# Patient Record
Sex: MALE | Race: OTHER | HISPANIC OR LATINO | Employment: FULL TIME | URBAN - METROPOLITAN AREA
[De-identification: names, ages, dates, MRNs, and addresses within clinical notes are randomized per-mention and may not be internally consistent; named-entity substitution may affect disease eponyms.]

---

## 2020-05-09 ENCOUNTER — LAB VISIT (OUTPATIENT)
Dept: PRIMARY CARE CLINIC | Facility: CLINIC | Age: 55
End: 2020-05-09
Payer: COMMERCIAL

## 2020-05-09 DIAGNOSIS — Z00.6 RESEARCH STUDY PATIENT: Primary | ICD-10-CM

## 2020-05-09 LAB — SARS-COV-2 IGG SERPLBLD QL IA.RAPID: NEGATIVE

## 2020-05-09 PROCEDURE — U0002 COVID-19 LAB TEST NON-CDC: HCPCS

## 2020-05-09 PROCEDURE — 86769 SARS-COV-2 COVID-19 ANTIBODY: CPT

## 2020-05-09 NOTE — RESEARCH
Date of Consent: 05/09/2020    Sponsor: [facility]    Study Title/IRB Number: Observational study of Sars-CoV2 Immunoglobulin G (IgG) seroprevalence among the [facility] population over time 2020.163  Principle Investigator: Angely Samuel, PhD    Did the patient need translation services? No   name: N/A    Prior to the Informed Consent (IC) being signed, or any study protocol required data collection, testing, procedure, or intervention being performed, the following was done and/or discussed:   Patient was given a paper copy of the IC for review    Patient was given study FAQ   Purpose of the study and qualifications to participate    Study design and tests or procedures done at this visit   Confidentiality and HIPAA Authorization for Release of Medical Records for the research trial/ subject's rights/research related injury   Risk, Benefits, Alternative Treatments, Compensation and Costs   Participation in the research trial is voluntary and patient may withdraw at anytime   Contact information for study related questions    Patient verbalizes understanding of the above: Yes  Contact information for PI and IRB given to patient: Yes  Patient able to adequately summarize: the purpose of the study, the risks associated with the study, and all procedures, testing, and follow-ups associated with the study: Yes    The consent was discussed verbally with the patient and all questions were answered satisfactorily. Patient gave verbal consent for the Seroprevalence research study with an IRB approval date of 05/08/2020.      The Consent, Consent Witness and name of Clinical Research Coordinator consenting was captured and documented in [employer].    All Inclusion and Exclusion Criteria reviewed, subject meets all Inclusion criteria and does not meet any Exclusion Criteria at this time.     Patient Eligibility was confirmed.    Patient responded to survey questions.    The following biospecimen  collection procedures were collected:    -Nasopharyngeal Swab Collection  -Blood collection

## 2020-05-12 LAB — SARS-COV-2 RNA RESP QL NAA+PROBE: NOT DETECTED

## 2020-05-22 ENCOUNTER — LAB VISIT (OUTPATIENT)
Dept: PRIMARY CARE CLINIC | Facility: CLINIC | Age: 55
End: 2020-05-22
Payer: COMMERCIAL

## 2020-05-22 DIAGNOSIS — R05.9 COUGH: Primary | ICD-10-CM

## 2020-05-22 PROCEDURE — U0003 INFECTIOUS AGENT DETECTION BY NUCLEIC ACID (DNA OR RNA); SEVERE ACUTE RESPIRATORY SYNDROME CORONAVIRUS 2 (SARS-COV-2) (CORONAVIRUS DISEASE [COVID-19]), AMPLIFIED PROBE TECHNIQUE, MAKING USE OF HIGH THROUGHPUT TECHNOLOGIES AS DESCRIBED BY CMS-2020-01-R: HCPCS

## 2020-05-24 LAB — SARS-COV-2 RNA RESP QL NAA+PROBE: NOT DETECTED

## 2020-06-23 ENCOUNTER — OFFICE VISIT (OUTPATIENT)
Dept: URGENT CARE | Facility: CLINIC | Age: 55
End: 2020-06-23
Payer: COMMERCIAL

## 2020-06-23 VITALS
WEIGHT: 240 LBS | OXYGEN SATURATION: 96 % | HEIGHT: 66 IN | HEART RATE: 90 BPM | SYSTOLIC BLOOD PRESSURE: 159 MMHG | TEMPERATURE: 99 F | BODY MASS INDEX: 38.57 KG/M2 | DIASTOLIC BLOOD PRESSURE: 97 MMHG | RESPIRATION RATE: 20 BRPM

## 2020-06-23 DIAGNOSIS — Z11.59 SCREENING FOR VIRAL DISEASE: Primary | ICD-10-CM

## 2020-06-23 PROCEDURE — 99212 OFFICE O/P EST SF 10 MIN: CPT | Mod: S$GLB,,, | Performed by: FAMILY MEDICINE

## 2020-06-23 PROCEDURE — U0003 INFECTIOUS AGENT DETECTION BY NUCLEIC ACID (DNA OR RNA); SEVERE ACUTE RESPIRATORY SYNDROME CORONAVIRUS 2 (SARS-COV-2) (CORONAVIRUS DISEASE [COVID-19]), AMPLIFIED PROBE TECHNIQUE, MAKING USE OF HIGH THROUGHPUT TECHNOLOGIES AS DESCRIBED BY CMS-2020-01-R: HCPCS

## 2020-06-23 PROCEDURE — 99212 PR OFFICE/OUTPT VISIT, EST, LEVL II, 10-19 MIN: ICD-10-PCS | Mod: S$GLB,,, | Performed by: FAMILY MEDICINE

## 2020-06-23 RX ORDER — NEBIVOLOL HYDROCHLORIDE 10 MG/1
10 TABLET ORAL DAILY
COMMUNITY
Start: 2020-06-02 | End: 2023-01-19 | Stop reason: SDUPTHER

## 2020-06-23 RX ORDER — AMLODIPINE AND OLMESARTAN MEDOXOMIL 5; 40 MG/1; MG/1
TABLET ORAL
COMMUNITY
Start: 2020-06-02 | End: 2021-10-11

## 2020-06-23 RX ORDER — ATORVASTATIN CALCIUM 20 MG/1
20 TABLET, FILM COATED ORAL DAILY
COMMUNITY
Start: 2020-05-28 | End: 2022-01-24 | Stop reason: SDUPTHER

## 2020-06-23 NOTE — PATIENT INSTRUCTIONS
Instructions for Patients with Confirmed or Suspected COVID-19    If you are awaiting your test result, you will either be called or it will be released to the patient portal.  If you have any questions about your test, please visit [email or web address] or call our COVID-19 information line at [phone].      Preventing the Spread of Coronavirus Disease 2019 (COVID-19) in Homes and Residential Communities -- Patients     Prevention steps for people with confirmed or suspected COVID-19 (including persons under investigation) who do not need to be hospitalized and people with confirmed COVID-19 who were hospitalized and determined to be medically stable to go home.    Stay home except to get medical care.    Separate yourself from other people and animals in your home.    Call ahead before visiting your doctor.    Wear a face mask.    Cover your coughs and sneezes.    Clean your hands often.    Avoid sharing personal household items.    Clean all high-touch surfaces every day.    Monitor your symptoms. Seek prompt medical attention if your illness is worsening (e.g., difficulty breathing). Before seeking care, call your healthcare provider.    If you have a medical emergency and must call 911, notify the dispatcher that you have or are being evaluated for COVID-19. If possible, put on a face mask before emergency medical services arrive.    Use the following symptom-based strategy to return to normal activity following a suspected or confirmed case of COVID-19. Continue isolation until:   o At least 3 days (72 hours) have passed since recovery defined as resolution of fever without the use of fever-reducing medications and improvement in respiratory symptoms (e.g. cough, shortness of breath), and   o At least 10 days have passed since symptoms first appeared.     Precautions for household members, intimate partners and caregivers in a non-healthcare setting of a patient with symptomatic  laboratory-confirmed COVID-19 or a patient under investigation.     Household members, intimate partners and caregivers in a non-healthcare setting may have close contact with a person with symptomatic, laboratory-confirmed COVID-19 or a person under investigation. Close contacts should monitor their health; they should call their healthcare provider right away if they develop symptoms suggestive of COVID-19 (e.g., fever, cough, shortness of breath). Close contacts should also follow these recommendations:      Make sure that you understand and can help the patient follow their healthcare providers instructions for medication(s) and care. You should help the patient with basic needs in the home and provide support for getting groceries, prescriptions, and other personal needs.    Monitor the patients symptoms. If the patient is getting sicker, call his or her healthcare provider and tell them that the patient has laboratory-confirmed COVID-19. This will help the healthcare providers office take steps to keep people in the office or waiting room from getting infected. Ask the healthcare provider to call the local or [facility] health department for additional guidance. If the patient has a medical emergency and you need to call 911, notify the dispatch personnel that the patient has or is being evaluated for COVID-19.    Household members should stay in another room or be  from the patient as much as possible. Household members should use a separate bedroom and bathroom, if available.    Prohibit visitors who do not have an essential need to be in the home.    Household members should care for any pets. Do not handle pets or other animals while sick.    Make sure that shared spaces in the home have good air flow, such as by an air conditioner.    Perform hand hygiene frequently. Wash your hands often with soap and water for at least 20 seconds or use an alcohol-based hand  that contains 60 to  95% alcohol, covering all surfaces of your hands and rubbing them together until they feel dry. Soap and water are preferred if hands are visibly dirty.    Avoid touching your eyes, nose and mouth with unwashed hands.    The patient should wear a face mask when you are around other people. If the patient is not able to wear a face mask (for example, because it causes trouble breathing), you, as the caregiver, should wear a mask when you are in the same room as the patient.    Wear a disposable face mask and gloves when you touch or have contact with the patients blood, stool or body fluids, such as saliva, sputum, nasal mucus, vomit and urine.   o Throw out disposable face masks and gloves after using them. Do not reuse.   o When removing personal protective equipment, first remove and dispose of gloves. Then, immediately clean your hands with soap and water or alcohol-based hand . Next, remove and dispose of face mask, and immediately clean your hands again with soap and water or alcohol-based hand .    Avoid sharing household items with the patient. You should not share dishes, drinking glasses, cups, eating utensils, towels, bedding or other items. After the patient uses these items, you should wash them thoroughly (see below Wash laundry thoroughly).    Clean all high-touch surfaces, such as counters, tabletops, doorknobs, bathroom fixtures, toilets, phones, keyboards, tablets and bedside tables, every day. Also, clean any surfaces that may have blood, stool or body fluids on them.   o Use a household cleaning spray or wipe, according to the label instructions. Labels contain instructions for safe and effective use of the cleaning product including precautions you should take when applying the product, such as wearing gloves and making sure you have good ventilation during use of the product.    Wash laundry thoroughly.   o Immediately remove and wash clothes or bedding that have  blood, stool or body fluids on them.  o Wear disposable gloves while handling soiled items and keep soiled items away from your body. Clean your hands (with soap and water or an alcohol-based hand ) immediately after removing your gloves.   o Read and follow directions on labels of laundry or clothing items and detergent. In general, using a normal laundry detergent according to washing machine instructions and dry thoroughly using the warmest temperatures recommended on the clothing label.    Place all used disposable gloves, face masks and other contaminated items in a lined container before disposing of them with other household waste. Clean your hands (with soap and water or an alcohol-based hand ) immediately after handling these items. Soap and water should be used preferentially if hands are visibly dirty.   Discuss any additional questions with your state or local health department

## 2020-06-23 NOTE — PROGRESS NOTES
"Subjective:       Patient ID: Toño Ye is a 54 y.o. male.    Vitals:  height is 5' 6" (1.676 m) and weight is 108.9 kg (240 lb). His temperature is 99.3 °F (37.4 °C). His blood pressure is 159/97 (abnormal) and his pulse is 90. His respiration is 20 and oxygen saturation is 96%.     Chief Complaint: COVID-19 Concerns    54 year old male c/o possible exposure to COVID.  He is asymptomatic.      Constitution: Negative for appetite change, chills, sweating, fatigue and fever.   HENT: Negative for ear pain, congestion, sinus pain, sinus pressure, sore throat, trouble swallowing and voice change.    Neck: Negative for neck pain and painful lymph nodes.   Cardiovascular: Negative for chest trauma, chest pain, leg swelling, palpitations and passing out.   Eyes: Negative for eye redness, double vision and blurred vision.   Respiratory: Negative for sleep apnea, chest tightness, cough, sputum production, bloody sputum, COPD, shortness of breath, stridor, wheezing and asthma.    Gastrointestinal: Negative for abdominal trauma, abdominal pain, abdominal bloating, history of abdominal surgery, nausea, vomiting, constipation, diarrhea, dark colored stools and heartburn.   Genitourinary: Negative for dysuria, frequency, urgency and pelvic pain.   Musculoskeletal: Negative for joint pain, joint swelling, back pain, pain with walking, muscle cramps and muscle ache.   Skin: Negative for color change, pale and rash.   Allergic/Immunologic: Negative for seasonal allergies and asthma.   Neurological: Negative for dizziness, history of vertigo, light-headedness, passing out and headaches.   Hematologic/Lymphatic: Negative for swollen lymph nodes, easy bruising/bleeding and history of blood clots. Does not bruise/bleed easily.   Psychiatric/Behavioral: Negative for nervous/anxious, sleep disturbance and depression. The patient is not nervous/anxious.        Objective:      Physical Exam   Abdominal: Normal appearance. "   Neurological: He is alert.   Nursing note and vitals reviewed.        Assessment:       1. Screening for viral disease        Plan:         Screening for viral disease  -     COVID-19 Routine Screening    counseled as to COVID-19 and test results. Verbalized understanding.

## 2020-06-26 LAB — SARS-COV-2 RNA RESP QL NAA+PROBE: NOT DETECTED

## 2020-06-29 ENCOUNTER — CLINICAL SUPPORT (OUTPATIENT)
Dept: URGENT CARE | Facility: CLINIC | Age: 55
End: 2020-06-29
Payer: COMMERCIAL

## 2020-06-29 DIAGNOSIS — Z03.818 ENCOUNTER FOR OBSERVATION FOR SUSPECTED EXPOSURE TO OTHER BIOLOGICAL AGENTS RULED OUT: ICD-10-CM

## 2020-06-29 PROCEDURE — 99211 OFF/OP EST MAY X REQ PHY/QHP: CPT | Mod: S$GLB,,, | Performed by: PHYSICIAN ASSISTANT

## 2020-06-29 PROCEDURE — U0003 INFECTIOUS AGENT DETECTION BY NUCLEIC ACID (DNA OR RNA); SEVERE ACUTE RESPIRATORY SYNDROME CORONAVIRUS 2 (SARS-COV-2) (CORONAVIRUS DISEASE [COVID-19]), AMPLIFIED PROBE TECHNIQUE, MAKING USE OF HIGH THROUGHPUT TECHNOLOGIES AS DESCRIBED BY CMS-2020-01-R: HCPCS

## 2020-06-29 PROCEDURE — 99211 PR OFFICE/OUTPT VISIT, EST, LEVL I: ICD-10-PCS | Mod: S$GLB,,, | Performed by: PHYSICIAN ASSISTANT

## 2020-07-04 LAB — SARS-COV-2 RNA RESP QL NAA+PROBE: NOT DETECTED

## 2020-07-05 ENCOUNTER — TELEPHONE (OUTPATIENT)
Dept: URGENT CARE | Facility: CLINIC | Age: 55
End: 2020-07-05

## 2020-07-05 NOTE — TELEPHONE ENCOUNTER
----- Message from Mishel Ford NP sent at 7/5/2020  2:57 PM CDT -----  Please notify patient of negative covid pcr

## 2020-07-13 ENCOUNTER — OFFICE VISIT (OUTPATIENT)
Dept: URGENT CARE | Facility: CLINIC | Age: 55
End: 2020-07-13
Payer: COMMERCIAL

## 2020-07-13 VITALS
SYSTOLIC BLOOD PRESSURE: 114 MMHG | DIASTOLIC BLOOD PRESSURE: 77 MMHG | TEMPERATURE: 98 F | HEART RATE: 81 BPM | OXYGEN SATURATION: 97 %

## 2020-07-13 DIAGNOSIS — R09.81 NASAL SINUS CONGESTION: Primary | ICD-10-CM

## 2020-07-13 DIAGNOSIS — R09.82 POST-NASAL DRIP: ICD-10-CM

## 2020-07-13 DIAGNOSIS — R05.9 COUGH: ICD-10-CM

## 2020-07-13 PROCEDURE — 99214 PR OFFICE/OUTPT VISIT, EST, LEVL IV, 30-39 MIN: ICD-10-PCS | Mod: S$GLB,,, | Performed by: NURSE PRACTITIONER

## 2020-07-13 PROCEDURE — 99214 OFFICE O/P EST MOD 30 MIN: CPT | Mod: S$GLB,,, | Performed by: NURSE PRACTITIONER

## 2020-07-13 RX ORDER — FLUTICASONE PROPIONATE 50 MCG
1 SPRAY, SUSPENSION (ML) NASAL DAILY
Qty: 15.8 ML | Refills: 1 | Status: SHIPPED | OUTPATIENT
Start: 2020-07-13 | End: 2024-02-27

## 2020-07-13 NOTE — PATIENT INSTRUCTIONS
"Return to Urgent Care or go to ER if symptoms worsen or fail to improve.  Follow up with PCP as recommended for further management.     THE FOLLOWING ARE RECOMMENDED TO HELP YOU MANAGE YOUR SYMPTOMS:    Use Nasal Saline, such as Akil Med nasal wash cannister using "large volume/low pressure" tip or Neti Pot, or nasal wash squeeze bottle, to mechanically move any post nasal drip from your eustachian tube or from the back of your throat. Use the nasal saline prior to using any topical nasal sprays to help clear the mucus so the medication is able to get to the mucus membranes.      Use Flonase 1-2 sprays/nostril per day. It is a local acting steroid nasal spray, if you develop a bloody nose, stop using the medication immediately.    Use Afrin in each nare for no longer than 3-5 days, as it is addictive. It can also dry out your mucous membranes and cause elevated blood pressure.    Take Ibuprofen or Acetaminophen according to label instructions for fever, throat pain, headache, and body aches    Use warm salt water gargles to ease your throat pain. Warm salt water gargles as needed for sore throat-  1/2 tsp salt to 1 cup warm water, gargle as desired.    Sometimes Nyquil at night is beneficial to help you get some rest, however it is sedating and does have an antihistamine, as well as tylenol.  The Nyquil behind the pharmacy counter also has the decongestant, pseudoephedrine as an additional ingredient.         "

## 2020-07-13 NOTE — PROGRESS NOTES
Subjective:       Patient ID: Toño Ye is a 54 y.o. male.    Vitals:  temperature is 98.4 °F (36.9 °C). His blood pressure is 114/77 and his pulse is 81. His oxygen saturation is 97%.     Chief Complaint: Cough    Pt states symptoms started last Wednesday with cough, sore throat, fatigue and congestion.  He has received 4 covid swabs x 6 weeks which were all negative.      Cough  This is a new problem. The current episode started in the past 7 days (last wednesday). The problem has been unchanged. The cough is non-productive. Associated symptoms include postnasal drip and rhinorrhea. Pertinent negatives include no chills, ear pain, eye redness, fever, hemoptysis, myalgias, rash, sore throat, shortness of breath or wheezing. Nothing aggravates the symptoms. He has tried OTC cough suppressant (Nyquil, Allegra, Mucinex) for the symptoms. The treatment provided no relief.       Constitution: Negative for chills, sweating, fatigue and fever.   HENT: Positive for postnasal drip. Negative for ear pain, congestion, sinus pain, sinus pressure, sore throat and voice change.    Neck: Negative for painful lymph nodes.   Eyes: Negative for eye redness.   Respiratory: Positive for cough. Negative for chest tightness, sputum production, bloody sputum, COPD, shortness of breath, stridor, wheezing and asthma.    Gastrointestinal: Negative for nausea and vomiting.   Musculoskeletal: Negative for muscle ache.   Skin: Negative for rash.   Allergic/Immunologic: Negative for seasonal allergies and asthma.   Hematologic/Lymphatic: Negative for swollen lymph nodes.       Objective:      Physical Exam   Constitutional: He is oriented to person, place, and time. He appears well-developed. He is cooperative.  Non-toxic appearance. He does not appear ill. No distress.   HENT:   Head: Normocephalic and atraumatic.   Right Ear: Hearing, external ear and ear canal normal. Tympanic membrane is bulging. Tympanic membrane is not  erythematous.   Left Ear: Hearing, tympanic membrane, external ear and ear canal normal. Tympanic membrane is not erythematous.   Nose: Nose normal. No mucosal edema, rhinorrhea or nasal deformity. No epistaxis. Right sinus exhibits no maxillary sinus tenderness and no frontal sinus tenderness. Left sinus exhibits no maxillary sinus tenderness and no frontal sinus tenderness.   Mouth/Throat: Uvula is midline, oropharynx is clear and moist and mucous membranes are normal. No trismus in the jaw. Normal dentition. No uvula swelling. No oropharyngeal exudate, posterior oropharyngeal edema or posterior oropharyngeal erythema.   Eyes: Conjunctivae and lids are normal. No scleral icterus.   Neck: Trachea normal, full passive range of motion without pain and phonation normal. Neck supple. No neck rigidity. No edema and no erythema present.   Cardiovascular: Normal rate, regular rhythm, normal heart sounds and normal pulses.   Pulmonary/Chest: Effort normal and breath sounds normal. No respiratory distress. He has no decreased breath sounds. He has no wheezes. He has no rhonchi.   Abdominal: Normal appearance.   Musculoskeletal: Normal range of motion.         General: No deformity.   Neurological: He is alert and oriented to person, place, and time. He exhibits normal muscle tone. Coordination normal.   Skin: Skin is warm, dry, intact, not diaphoretic and not pale.   Psychiatric: His speech is normal and behavior is normal. Judgment and thought content normal.   Nursing note and vitals reviewed.        Assessment:       1. Nasal sinus congestion    2. Post-nasal drip    3. Cough        Plan:         Nasal sinus congestion  -     fluticasone propionate (FLONASE) 50 mcg/actuation nasal spray; 1 spray (50 mcg total) by Each Nostril route once daily.  Dispense: 15.8 mL; Refill: 1    Post-nasal drip  -     fluticasone propionate (FLONASE) 50 mcg/actuation nasal spray; 1 spray (50 mcg total) by Each Nostril route once daily.   Dispense: 15.8 mL; Refill: 1    Cough  -     fluticasone propionate (FLONASE) 50 mcg/actuation nasal spray; 1 spray (50 mcg total) by Each Nostril route once daily.  Dispense: 15.8 mL; Refill: 1

## 2020-07-13 NOTE — LETTER
July 13, 2020     [facility]  [address]  [facility] [address]  Phone: [phone]  Fax: [phone]       Patient: Toño Ye   YOB: 1965  Date of Visit: 07/13/2020/    To Whom It May Concern:    Debbie Ye  was at [facility] on 07/14/2020. He may return to work/school on July 14, 2020 with no restrictions. If you have any questions or concerns, or if I can be of further assistance, please do not hesitate to contact me.    Sincerely,          Rsoanna Diane MA

## 2021-10-11 ENCOUNTER — OFFICE VISIT (OUTPATIENT)
Dept: FAMILY MEDICINE | Facility: CLINIC | Age: 56
End: 2021-10-11
Payer: COMMERCIAL

## 2021-10-11 VITALS
OXYGEN SATURATION: 98 % | SYSTOLIC BLOOD PRESSURE: 128 MMHG | DIASTOLIC BLOOD PRESSURE: 82 MMHG | HEART RATE: 76 BPM | RESPIRATION RATE: 18 BRPM | WEIGHT: 263.69 LBS | TEMPERATURE: 98 F | HEIGHT: 67 IN | BODY MASS INDEX: 41.39 KG/M2

## 2021-10-11 DIAGNOSIS — Z00.00 ANNUAL PHYSICAL EXAM: Primary | ICD-10-CM

## 2021-10-11 DIAGNOSIS — Z11.59 ENCOUNTER FOR HEPATITIS C SCREENING TEST FOR LOW RISK PATIENT: ICD-10-CM

## 2021-10-11 DIAGNOSIS — Z12.5 PROSTATE CANCER SCREENING: ICD-10-CM

## 2021-10-11 PROCEDURE — 3008F BODY MASS INDEX DOCD: CPT | Mod: CPTII,S$GLB,, | Performed by: INTERNAL MEDICINE

## 2021-10-11 PROCEDURE — 3079F DIAST BP 80-89 MM HG: CPT | Mod: CPTII,S$GLB,, | Performed by: INTERNAL MEDICINE

## 2021-10-11 PROCEDURE — 3079F PR MOST RECENT DIASTOLIC BLOOD PRESSURE 80-89 MM HG: ICD-10-PCS | Mod: CPTII,S$GLB,, | Performed by: INTERNAL MEDICINE

## 2021-10-11 PROCEDURE — 1160F PR REVIEW ALL MEDS BY PRESCRIBER/CLIN PHARMACIST DOCUMENTED: ICD-10-PCS | Mod: CPTII,S$GLB,, | Performed by: INTERNAL MEDICINE

## 2021-10-11 PROCEDURE — 1160F RVW MEDS BY RX/DR IN RCRD: CPT | Mod: CPTII,S$GLB,, | Performed by: INTERNAL MEDICINE

## 2021-10-11 PROCEDURE — 3074F PR MOST RECENT SYSTOLIC BLOOD PRESSURE < 130 MM HG: ICD-10-PCS | Mod: CPTII,S$GLB,, | Performed by: INTERNAL MEDICINE

## 2021-10-11 PROCEDURE — 99999 PR PBB SHADOW E&M-EST. PATIENT-LVL IV: ICD-10-PCS | Mod: PBBFAC,,, | Performed by: INTERNAL MEDICINE

## 2021-10-11 PROCEDURE — 3008F PR BODY MASS INDEX (BMI) DOCUMENTED: ICD-10-PCS | Mod: CPTII,S$GLB,, | Performed by: INTERNAL MEDICINE

## 2021-10-11 PROCEDURE — 99999 PR PBB SHADOW E&M-EST. PATIENT-LVL IV: CPT | Mod: PBBFAC,,, | Performed by: INTERNAL MEDICINE

## 2021-10-11 PROCEDURE — 99386 PR PREVENTIVE VISIT,NEW,40-64: ICD-10-PCS | Mod: S$GLB,,, | Performed by: INTERNAL MEDICINE

## 2021-10-11 PROCEDURE — 99386 PREV VISIT NEW AGE 40-64: CPT | Mod: S$GLB,,, | Performed by: INTERNAL MEDICINE

## 2021-10-11 PROCEDURE — 3074F SYST BP LT 130 MM HG: CPT | Mod: CPTII,S$GLB,, | Performed by: INTERNAL MEDICINE

## 2021-10-11 PROCEDURE — 1159F MED LIST DOCD IN RCRD: CPT | Mod: CPTII,S$GLB,, | Performed by: INTERNAL MEDICINE

## 2021-10-11 PROCEDURE — 1159F PR MEDICATION LIST DOCUMENTED IN MEDICAL RECORD: ICD-10-PCS | Mod: CPTII,S$GLB,, | Performed by: INTERNAL MEDICINE

## 2021-10-11 RX ORDER — FENOFIBRATE 160 MG/1
160 TABLET ORAL DAILY
COMMUNITY
End: 2021-11-03 | Stop reason: SDUPTHER

## 2021-10-13 ENCOUNTER — TELEPHONE (OUTPATIENT)
Dept: FAMILY MEDICINE | Facility: CLINIC | Age: 56
End: 2021-10-13

## 2021-10-14 DIAGNOSIS — E11.9 DIABETES MELLITUS WITHOUT COMPLICATION: Primary | ICD-10-CM

## 2021-10-15 ENCOUNTER — TELEPHONE (OUTPATIENT)
Dept: ADMINISTRATIVE | Facility: HOSPITAL | Age: 56
End: 2021-10-15

## 2021-10-20 ENCOUNTER — TELEPHONE (OUTPATIENT)
Dept: ADMINISTRATIVE | Facility: HOSPITAL | Age: 56
End: 2021-10-20

## 2021-10-26 ENCOUNTER — TELEPHONE (OUTPATIENT)
Dept: ADMINISTRATIVE | Facility: HOSPITAL | Age: 56
End: 2021-10-26
Payer: COMMERCIAL

## 2021-11-03 ENCOUNTER — PATIENT MESSAGE (OUTPATIENT)
Dept: FAMILY MEDICINE | Facility: CLINIC | Age: 56
End: 2021-11-03
Payer: COMMERCIAL

## 2021-11-04 RX ORDER — FENOFIBRATE 160 MG/1
160 TABLET ORAL DAILY
Qty: 90 TABLET | Refills: 3 | Status: SHIPPED | OUTPATIENT
Start: 2021-11-04 | End: 2022-11-18

## 2021-11-30 ENCOUNTER — TELEPHONE (OUTPATIENT)
Dept: ADMINISTRATIVE | Facility: HOSPITAL | Age: 56
End: 2021-11-30
Payer: COMMERCIAL

## 2022-01-10 PROBLEM — Z00.00 ANNUAL PHYSICAL EXAM: Status: RESOLVED | Noted: 2021-10-11 | Resolved: 2022-01-10

## 2022-01-24 ENCOUNTER — PATIENT MESSAGE (OUTPATIENT)
Dept: FAMILY MEDICINE | Facility: CLINIC | Age: 57
End: 2022-01-24
Payer: COMMERCIAL

## 2022-01-24 RX ORDER — ATORVASTATIN CALCIUM 20 MG/1
20 TABLET, FILM COATED ORAL DAILY
Qty: 90 TABLET | Refills: 3 | Status: SHIPPED | OUTPATIENT
Start: 2022-01-24 | End: 2022-11-07 | Stop reason: SDUPTHER

## 2022-01-24 NOTE — TELEPHONE ENCOUNTER
No new care gaps identified.  Powered by [email or web address]. Reference number: [id number].   1/24/2022 10:01:05 AM CST

## 2022-06-20 ENCOUNTER — IMMUNIZATION (OUTPATIENT)
Dept: INTERNAL MEDICINE | Facility: CLINIC | Age: 57
End: 2022-06-20
Payer: COMMERCIAL

## 2022-06-20 DIAGNOSIS — Z23 NEED FOR VACCINATION: Primary | ICD-10-CM

## 2022-06-20 PROCEDURE — 91305 COVID-19, MRNA, LNP-S, PF, 30 MCG/0.3 ML DOSE VACCINE (PFIZER): CPT | Mod: PBBFAC | Performed by: INTERNAL MEDICINE

## 2022-06-24 DIAGNOSIS — E11.9 DIABETES MELLITUS WITHOUT COMPLICATION: Primary | ICD-10-CM

## 2022-06-27 DIAGNOSIS — E11.9 DIABETES MELLITUS WITHOUT COMPLICATION: Primary | ICD-10-CM

## 2022-07-08 ENCOUNTER — TELEPHONE (OUTPATIENT)
Dept: INTERNAL MEDICINE | Facility: CLINIC | Age: 57
End: 2022-07-08
Payer: COMMERCIAL

## 2022-11-07 RX ORDER — ATORVASTATIN CALCIUM 20 MG/1
20 TABLET, FILM COATED ORAL DAILY
Qty: 90 TABLET | Refills: 3 | Status: SHIPPED | OUTPATIENT
Start: 2022-11-07 | End: 2024-01-18

## 2022-11-07 NOTE — TELEPHONE ENCOUNTER
Care Due:                  Date            Visit Type   Department     Provider  --------------------------------------------------------------------------------                                NP -                              PRIMARY      DESC FAMILY  Last Visit: 10-      [facility]  Peter Person                              [facility]                              ANNUAL                              CHECKUP/Y  [facility] PRIMARY  Next Visit: 01-      [facility]           Peter Person                                                            Last  Test          Frequency    Reason                     Performed    Due Date  --------------------------------------------------------------------------------    CBC.........  12 months..  fenofibrate..............  10-   10-    CMP.........  12 months..  atorvastatin, fenofibrate  10-   10-    Lipid Panel.  12 months..  atorvastatin, fenofibrate  10-   10-    [facility] Embedded Care Gaps. Reference number: [id number]. 11/07/2022   7:05:26 AM CST

## 2023-01-12 ENCOUNTER — TELEPHONE (OUTPATIENT)
Dept: INTERNAL MEDICINE | Facility: CLINIC | Age: 58
End: 2023-01-12
Payer: COMMERCIAL

## 2023-01-12 DIAGNOSIS — Z12.5 PROSTATE CANCER SCREENING: ICD-10-CM

## 2023-01-12 DIAGNOSIS — Z00.00 ANNUAL PHYSICAL EXAM: Primary | ICD-10-CM

## 2023-01-12 NOTE — TELEPHONE ENCOUNTER
----- Message from Flory Harris sent at 1/12/2023  1:13 PM CST -----  Contact: pt [phone]  Patient scheduled their Annual Physical and is requesting labs prior to appt. Please enter order and contact patient to schedule.    Date of Annual Physical:  01/19/2023    Would the patient rather a call back or a response via [facility]? call    Thank you

## 2023-01-17 ENCOUNTER — LAB VISIT (OUTPATIENT)
Dept: LAB | Facility: HOSPITAL | Age: 58
End: 2023-01-17
Attending: INTERNAL MEDICINE
Payer: COMMERCIAL

## 2023-01-17 DIAGNOSIS — Z12.5 PROSTATE CANCER SCREENING: ICD-10-CM

## 2023-01-17 DIAGNOSIS — Z00.00 ANNUAL PHYSICAL EXAM: ICD-10-CM

## 2023-01-17 LAB
ALBUMIN SERPL BCP-MCNC: 4.2 G/DL (ref 3.5–5.2)
ALP SERPL-CCNC: 73 U/L (ref 55–135)
ALT SERPL W/O P-5'-P-CCNC: 127 U/L (ref 10–44)
ANION GAP SERPL CALC-SCNC: 8 MMOL/L (ref 8–16)
AST SERPL-CCNC: 75 U/L (ref 10–40)
BASOPHILS # BLD AUTO: 0.03 K/UL (ref 0–0.2)
BASOPHILS NFR BLD: 0.5 % (ref 0–1.9)
BILIRUB SERPL-MCNC: 0.8 MG/DL (ref 0.1–1)
BUN SERPL-MCNC: 23 MG/DL (ref 6–20)
CALCIUM SERPL-MCNC: 10.2 MG/DL (ref 8.7–10.5)
CHLORIDE SERPL-SCNC: 103 MMOL/L (ref 95–110)
CHOLEST SERPL-MCNC: 159 MG/DL (ref 120–199)
CHOLEST/HDLC SERPL: 5.5 {RATIO} (ref 2–5)
CO2 SERPL-SCNC: 26 MMOL/L (ref 23–29)
COMPLEXED PSA SERPL-MCNC: 0.99 NG/ML (ref 0–4)
CREAT SERPL-MCNC: 0.8 MG/DL (ref 0.5–1.4)
DIFFERENTIAL METHOD: NORMAL
EOSINOPHIL # BLD AUTO: 0.2 K/UL (ref 0–0.5)
EOSINOPHIL NFR BLD: 3 % (ref 0–8)
ERYTHROCYTE [DISTWIDTH] IN BLOOD BY AUTOMATED COUNT: 13.7 % (ref 11.5–14.5)
EST. GFR  (NO RACE VARIABLE): >60 ML/MIN/1.73 M^2
ESTIMATED AVG GLUCOSE: 194 MG/DL (ref 68–131)
GLUCOSE SERPL-MCNC: 201 MG/DL (ref 70–110)
HBA1C MFR BLD: 8.4 % (ref 4–5.6)
HCT VFR BLD AUTO: 46.2 % (ref 40–54)
HDLC SERPL-MCNC: 29 MG/DL (ref 40–75)
HDLC SERPL: 18.2 % (ref 20–50)
HGB BLD-MCNC: 15.1 G/DL (ref 14–18)
IMM GRANULOCYTES # BLD AUTO: 0.01 K/UL (ref 0–0.04)
IMM GRANULOCYTES NFR BLD AUTO: 0.2 % (ref 0–0.5)
LDLC SERPL CALC-MCNC: 61.6 MG/DL (ref 63–159)
LYMPHOCYTES # BLD AUTO: 1.7 K/UL (ref 1–4.8)
LYMPHOCYTES NFR BLD: 30.8 % (ref 18–48)
MCH RBC QN AUTO: 28.9 PG (ref 27–31)
MCHC RBC AUTO-ENTMCNC: 32.7 G/DL (ref 32–36)
MCV RBC AUTO: 89 FL (ref 82–98)
MONOCYTES # BLD AUTO: 0.6 K/UL (ref 0.3–1)
MONOCYTES NFR BLD: 11.1 % (ref 4–15)
NEUTROPHILS # BLD AUTO: 3 K/UL (ref 1.8–7.7)
NEUTROPHILS NFR BLD: 54.4 % (ref 38–73)
NONHDLC SERPL-MCNC: 130 MG/DL
NRBC BLD-RTO: 0 /100 WBC
PLATELET # BLD AUTO: 320 K/UL (ref 150–450)
PMV BLD AUTO: 10 FL (ref 9.2–12.9)
POTASSIUM SERPL-SCNC: 4.2 MMOL/L (ref 3.5–5.1)
PROT SERPL-MCNC: 7.7 G/DL (ref 6–8.4)
RBC # BLD AUTO: 5.22 M/UL (ref 4.6–6.2)
SODIUM SERPL-SCNC: 137 MMOL/L (ref 136–145)
TRIGL SERPL-MCNC: 342 MG/DL (ref 30–150)
WBC # BLD AUTO: 5.58 K/UL (ref 3.9–12.7)

## 2023-01-17 PROCEDURE — 80061 LIPID PANEL: CPT | Performed by: INTERNAL MEDICINE

## 2023-01-17 PROCEDURE — 80053 COMPREHEN METABOLIC PANEL: CPT | Performed by: INTERNAL MEDICINE

## 2023-01-17 PROCEDURE — 84153 ASSAY OF PSA TOTAL: CPT | Performed by: INTERNAL MEDICINE

## 2023-01-17 PROCEDURE — 83036 HEMOGLOBIN GLYCOSYLATED A1C: CPT | Performed by: INTERNAL MEDICINE

## 2023-01-17 PROCEDURE — 36415 COLL VENOUS BLD VENIPUNCTURE: CPT | Performed by: INTERNAL MEDICINE

## 2023-01-17 PROCEDURE — 85025 COMPLETE CBC W/AUTO DIFF WBC: CPT | Performed by: INTERNAL MEDICINE

## 2023-01-19 ENCOUNTER — OFFICE VISIT (OUTPATIENT)
Dept: INTERNAL MEDICINE | Facility: CLINIC | Age: 58
End: 2023-01-19
Payer: COMMERCIAL

## 2023-01-19 VITALS
BODY MASS INDEX: 41.19 KG/M2 | SYSTOLIC BLOOD PRESSURE: 118 MMHG | HEART RATE: 87 BPM | OXYGEN SATURATION: 96 % | HEIGHT: 67 IN | RESPIRATION RATE: 18 BRPM | WEIGHT: 262.44 LBS | DIASTOLIC BLOOD PRESSURE: 78 MMHG | TEMPERATURE: 99 F

## 2023-01-19 DIAGNOSIS — Z12.11 ENCOUNTER FOR SCREENING COLONOSCOPY: ICD-10-CM

## 2023-01-19 DIAGNOSIS — Z00.00 ANNUAL PHYSICAL EXAM: Primary | ICD-10-CM

## 2023-01-19 DIAGNOSIS — G47.33 OSA (OBSTRUCTIVE SLEEP APNEA): ICD-10-CM

## 2023-01-19 DIAGNOSIS — E11.9 DIABETES MELLITUS WITHOUT COMPLICATION: ICD-10-CM

## 2023-01-19 DIAGNOSIS — E66.01 MORBID OBESITY: ICD-10-CM

## 2023-01-19 PROCEDURE — 99999 PR PBB SHADOW E&M-EST. PATIENT-LVL V: ICD-10-PCS | Mod: PBBFAC,,, | Performed by: INTERNAL MEDICINE

## 2023-01-19 PROCEDURE — 3074F SYST BP LT 130 MM HG: CPT | Mod: CPTII,S$GLB,, | Performed by: INTERNAL MEDICINE

## 2023-01-19 PROCEDURE — 3078F DIAST BP <80 MM HG: CPT | Mod: CPTII,S$GLB,, | Performed by: INTERNAL MEDICINE

## 2023-01-19 PROCEDURE — 99999 PR PBB SHADOW E&M-EST. PATIENT-LVL V: CPT | Mod: PBBFAC,,, | Performed by: INTERNAL MEDICINE

## 2023-01-19 PROCEDURE — 1160F PR REVIEW ALL MEDS BY PRESCRIBER/CLIN PHARMACIST DOCUMENTED: ICD-10-PCS | Mod: CPTII,S$GLB,, | Performed by: INTERNAL MEDICINE

## 2023-01-19 PROCEDURE — 3008F PR BODY MASS INDEX (BMI) DOCUMENTED: ICD-10-PCS | Mod: CPTII,S$GLB,, | Performed by: INTERNAL MEDICINE

## 2023-01-19 PROCEDURE — 3008F BODY MASS INDEX DOCD: CPT | Mod: CPTII,S$GLB,, | Performed by: INTERNAL MEDICINE

## 2023-01-19 PROCEDURE — 3052F PR MOST RECENT HEMOGLOBIN A1C LEVEL 8.0 - < 9.0%: ICD-10-PCS | Mod: CPTII,S$GLB,, | Performed by: INTERNAL MEDICINE

## 2023-01-19 PROCEDURE — 3052F HG A1C>EQUAL 8.0%<EQUAL 9.0%: CPT | Mod: CPTII,S$GLB,, | Performed by: INTERNAL MEDICINE

## 2023-01-19 PROCEDURE — 1160F RVW MEDS BY RX/DR IN RCRD: CPT | Mod: CPTII,S$GLB,, | Performed by: INTERNAL MEDICINE

## 2023-01-19 PROCEDURE — 99396 PREV VISIT EST AGE 40-64: CPT | Mod: S$GLB,,, | Performed by: INTERNAL MEDICINE

## 2023-01-19 PROCEDURE — 99396 PR PREVENTIVE VISIT,EST,40-64: ICD-10-PCS | Mod: S$GLB,,, | Performed by: INTERNAL MEDICINE

## 2023-01-19 PROCEDURE — 3078F PR MOST RECENT DIASTOLIC BLOOD PRESSURE < 80 MM HG: ICD-10-PCS | Mod: CPTII,S$GLB,, | Performed by: INTERNAL MEDICINE

## 2023-01-19 PROCEDURE — 1159F MED LIST DOCD IN RCRD: CPT | Mod: CPTII,S$GLB,, | Performed by: INTERNAL MEDICINE

## 2023-01-19 PROCEDURE — 3074F PR MOST RECENT SYSTOLIC BLOOD PRESSURE < 130 MM HG: ICD-10-PCS | Mod: CPTII,S$GLB,, | Performed by: INTERNAL MEDICINE

## 2023-01-19 PROCEDURE — 1159F PR MEDICATION LIST DOCUMENTED IN MEDICAL RECORD: ICD-10-PCS | Mod: CPTII,S$GLB,, | Performed by: INTERNAL MEDICINE

## 2023-01-19 RX ORDER — NEBIVOLOL HYDROCHLORIDE 10 MG/1
10 TABLET ORAL DAILY
Qty: 90 TABLET | Refills: 3 | Status: SHIPPED | OUTPATIENT
Start: 2023-01-19 | End: 2024-01-17

## 2023-01-19 RX ORDER — METFORMIN HYDROCHLORIDE 500 MG/1
500 TABLET, EXTENDED RELEASE ORAL
Qty: 90 TABLET | Refills: 3 | Status: SHIPPED | OUTPATIENT
Start: 2023-01-19 | End: 2024-01-11

## 2023-01-19 NOTE — PROGRESS NOTES
[facility] Note    Chief Complaint      Chief Complaint   Patient presents with    Annual Exam       History of Present Illness      Toño Ye is a 57 y.o. male who presents today for   Chief Complaint   Patient presents with    Annual Exam   .  Patient comes to appointment today for annual and to review all labs . His blood glucose  has gone  up a1c at 8.4 . He is due for colonoscopy at this time will refer . He is making a resolution for better diet and increased exercise . Needs optho eval .     HPI    No problem-specific Assessment & Plan notes found for this encounter.       Problem List Items Addressed This Visit          Endocrine    Diabetes mellitus without complication    Overview     Will be rescheduling diabetic ed for blood glucose monitoring   Start metformin xr  500mg           Morbid obesity       GI    Encounter for screening colonoscopy       Other    Annual physical exam - Primary    Overview     pe documented all labs reviewed see below          ISAIAH (obstructive sleep apnea)    Overview     Utilizing cpap with great benefit using allnight everynight              Past Medical History:  Past Medical History:   Diagnosis Date    Hyperlipidemia     Hypertension        Past Surgical History:  Past Surgical History:   Procedure Laterality Date    COLONOSCOPY  11/20/2017    KNEE SURGERY         Family History:  family history includes Arthritis in his mother; Cancer in his father; Depression in his sister; Diabetes in his father; Drug abuse in his brother; Heart disease in his father; Hypertension in his father and mother; Lung cancer in his father.     Social History:  Social History     Socioeconomic History    Marital status:    Tobacco Use    Smoking status: Never    Smokeless tobacco: Never   Substance and Sexual Activity    Alcohol use: Yes     Alcohol/week: 4.0 standard drinks     Types: 2 Glasses of wine, 2 Drinks containing 0.5 oz of alcohol per  "week    Drug use: Never    Sexual activity: Yes     Partners: Female     Birth control/protection: None       Review of Systems:   Review of Systems   HENT:  Negative for hearing loss.    Eyes:  Negative for discharge.   Respiratory:  Negative for wheezing.    Cardiovascular:  Negative for chest pain and palpitations.   Gastrointestinal:  Negative for blood in stool, constipation, diarrhea and vomiting.   Genitourinary:  Negative for hematuria and urgency.   Musculoskeletal:  Negative for neck pain.   Neurological:  Positive for headaches. Negative for weakness.   Endo/Heme/Allergies:  Negative for polydipsia.      Medications:  Outpatient Encounter Medications as of 1/19/2023   Medication Sig Dispense Refill    atorvastatin (LIPITOR) 20 MG tablet Take 1 tablet (20 mg total) by mouth once daily. 90 tablet 3    fenofibrate 160 MG Tab TAKE 1 TABLET BY MOUTH ONCE DAILY. 90 tablet 3    fluticasone propionate (FLONASE) 50 mcg/actuation nasal spray 1 spray (50 mcg total) by Each Nostril route once daily. 15.8 mL 1    [DISCONTINUED] BYSTOLIC 10 mg Tab 10 mg once daily.       BYSTOLIC 10 mg Tab Take 1 tablet (10 mg total) by mouth once daily. 90 tablet 3    metFORMIN (GLUCOPHAGE-XR) 500 MG ER 24hr tablet Take 1 tablet (500 mg total) by mouth daily with breakfast. 90 tablet 3     No facility-administered encounter medications on file as of 1/19/2023.       Allergies:  Review of patient's allergies indicates:  No Known Allergies      Physical Exam      Vitals:    01/19/23 1409   BP: 118/78   Pulse: 87   Resp: 18   Temp: 98.6 °F (37 °C)        Vital Signs  Temp: 98.6 °F (37 °C)  Temp src: Oral  Pulse: 87  Resp: 18  SpO2: 96 %  BP: 118/78  BP Location: Left arm  Patient Position: Sitting  Pain Score: 0-No pain  Height and Weight  Height: 5' 6.5" (168.9 cm)  Weight: 119 kg (262 lb 7.3 oz)  BSA (Calculated - sq m): 2.36 sq meters  BMI (Calculated): 41.7  Weight in (lb) to have BMI = 25: 156.9]     Body mass index is 41.73 " kg/m².    Physical Exam  Constitutional:       Appearance: He is well-developed.   HENT:      Head: Normocephalic.   Eyes:      Pupils: Pupils are equal, round, and reactive to light.   Neck:      Thyroid: No thyromegaly.   Cardiovascular:      Rate and Rhythm: Normal rate and regular rhythm.      Heart sounds: No murmur heard.    No friction rub. No gallop.   Pulmonary:      Effort: Pulmonary effort is normal.      Breath sounds: Normal breath sounds.   Abdominal:      General: Bowel sounds are normal.      Palpations: Abdomen is soft.   Musculoskeletal:         General: Normal range of motion.      Cervical back: Normal range of motion.   Feet:      Right foot:      Protective Sensation: 4 sites tested.  4 sites sensed.      Left foot:      Protective Sensation: 4 sites tested.  4 sites sensed.   Skin:     General: Skin is warm and dry.   Neurological:      Mental Status: He is alert and oriented to person, place, and time.      Sensory: No sensory deficit.   Psychiatric:         Behavior: Behavior normal.        Laboratory:  CBC:  Recent Labs   Lab Result Units 01/17/23  0724   WBC K/uL 5.58   RBC M/uL 5.22   Hemoglobin g/dL 15.1   Hematocrit % 46.2   Platelets K/uL 320   MCV fL 89   MCH pg 28.9   MCHC g/dL 32.7     CMP:  Recent Labs   Lab Result Units 01/17/23  0724   Glucose mg/dL 201*   Calcium mg/dL 10.2   Albumin g/dL 4.2   Total Protein g/dL 7.7   Sodium mmol/L 137   Potassium mmol/L 4.2   CO2 mmol/L 26   Chloride mmol/L 103   BUN mg/dL 23*   Alkaline Phosphatase U/L 73   ALT U/L 127*   AST U/L 75*   Total Bilirubin mg/dL 0.8     URINALYSIS:  No results for input(s): COLORU, CLARITYU, SPECGRAV, PHUR, PROTEINUA, GLUCOSEU, BILIRUBINCON, BLOODU, WBCU, RBCU, BACTERIA, MUCUS, NITRITE, LEUKOCYTESUR, UROBILINOGEN, HYALINECASTS in the last 2160 hours.   LIPIDS:  Recent Labs   Lab Result Units 01/17/23  0724   HDL mg/dL 29*   Cholesterol mg/dL 159   Triglycerides mg/dL 342*   LDL Cholesterol mg/dL 61.6*    HDL/Cholesterol Ratio % 18.2*   Non-HDL Cholesterol mg/dL 130   Total Cholesterol/HDL Ratio  5.5*     TSH:  No results for input(s): TSH in the last 2160 hours.  A1C:  Recent Labs   Lab Result Units 01/17/23  0724   Hemoglobin A1C % 8.4*       Radiology:        Assessment:     Toño Ye is a 57 y.o.male with:    Annual physical exam    Diabetes mellitus without complication  -     metFORMIN (GLUCOPHAGE-XR) 500 MG ER 24hr tablet; Take 1 tablet (500 mg total) by mouth daily with breakfast.  Dispense: 90 tablet; Refill: 3  -     Hemoglobin A1C; Future; Expected date: 02/19/2023  -     Ambulatory referral/consult to Ophthalmology; Future; Expected date: 01/26/2023    Morbid obesity    Encounter for screening colonoscopy  -     Ambulatory referral/consult to Endo Procedure ; Future; Expected date: 01/20/2023    ISAIAH (obstructive sleep apnea)  -     BYSTOLIC 10 mg Tab; Take 1 tablet (10 mg total) by mouth once daily.  Dispense: 90 tablet; Refill: 3                Plan:     Problem List Items Addressed This Visit          Endocrine    Diabetes mellitus without complication    Overview     Will be rescheduling diabetic ed for blood glucose monitoring   Start metformin xr  500mg           Morbid obesity       GI    Encounter for screening colonoscopy       Other    Annual physical exam - Primary    Overview     pe documented all labs reviewed see below          ISAIAH (obstructive sleep apnea)    Overview     Utilizing cpap with great benefit using allnight everynight             As above, continue current medications and maintain follow up with specialists.  Return to clinic in 3 months.      Frederick W Dantagnan [facility] [place]                Answers submitted by the patient for this visit:  Review of Systems Questionnaire (Submitted on 1/19/2023)  activity change: No  unexpected weight change: No  rhinorrhea: No  trouble swallowing: No  visual disturbance: No  chest tightness:  No  polyuria: No  difficulty urinating: No  joint swelling: No  arthralgias: Yes  confusion: No  dysphoric mood: No

## 2023-02-01 DIAGNOSIS — E11.9 TYPE 2 DIABETES MELLITUS WITHOUT COMPLICATION: ICD-10-CM

## 2023-02-23 ENCOUNTER — LAB VISIT (OUTPATIENT)
Dept: LAB | Facility: HOSPITAL | Age: 58
End: 2023-02-23
Payer: COMMERCIAL

## 2023-02-23 DIAGNOSIS — E11.9 DIABETES MELLITUS WITHOUT COMPLICATION: ICD-10-CM

## 2023-02-23 LAB
ESTIMATED AVG GLUCOSE: 169 MG/DL (ref 68–131)
HBA1C MFR BLD: 7.5 % (ref 4–5.6)

## 2023-02-23 PROCEDURE — 83036 HEMOGLOBIN GLYCOSYLATED A1C: CPT | Performed by: INTERNAL MEDICINE

## 2023-02-23 PROCEDURE — 36415 COLL VENOUS BLD VENIPUNCTURE: CPT | Mod: PO | Performed by: INTERNAL MEDICINE

## 2023-02-24 ENCOUNTER — OFFICE VISIT (OUTPATIENT)
Dept: PRIMARY CARE CLINIC | Facility: CLINIC | Age: 58
End: 2023-02-24
Payer: COMMERCIAL

## 2023-02-24 VITALS
TEMPERATURE: 99 F | OXYGEN SATURATION: 97 % | BODY MASS INDEX: 38.96 KG/M2 | SYSTOLIC BLOOD PRESSURE: 124 MMHG | HEIGHT: 67 IN | DIASTOLIC BLOOD PRESSURE: 80 MMHG | HEART RATE: 75 BPM | RESPIRATION RATE: 18 BRPM | WEIGHT: 248.25 LBS

## 2023-02-24 DIAGNOSIS — F41.9 ANXIETY AND DEPRESSION: Primary | ICD-10-CM

## 2023-02-24 DIAGNOSIS — F32.A ANXIETY AND DEPRESSION: Primary | ICD-10-CM

## 2023-02-24 PROCEDURE — 1159F PR MEDICATION LIST DOCUMENTED IN MEDICAL RECORD: ICD-10-PCS | Mod: CPTII,S$GLB,, | Performed by: INTERNAL MEDICINE

## 2023-02-24 PROCEDURE — 99214 OFFICE O/P EST MOD 30 MIN: CPT | Mod: S$GLB,,, | Performed by: INTERNAL MEDICINE

## 2023-02-24 PROCEDURE — 3008F PR BODY MASS INDEX (BMI) DOCUMENTED: ICD-10-PCS | Mod: CPTII,S$GLB,, | Performed by: INTERNAL MEDICINE

## 2023-02-24 PROCEDURE — 3008F BODY MASS INDEX DOCD: CPT | Mod: CPTII,S$GLB,, | Performed by: INTERNAL MEDICINE

## 2023-02-24 PROCEDURE — 3079F DIAST BP 80-89 MM HG: CPT | Mod: CPTII,S$GLB,, | Performed by: INTERNAL MEDICINE

## 2023-02-24 PROCEDURE — 1160F RVW MEDS BY RX/DR IN RCRD: CPT | Mod: CPTII,S$GLB,, | Performed by: INTERNAL MEDICINE

## 2023-02-24 PROCEDURE — 99999 PR PBB SHADOW E&M-EST. PATIENT-LVL IV: ICD-10-PCS | Mod: PBBFAC,,, | Performed by: INTERNAL MEDICINE

## 2023-02-24 PROCEDURE — 3074F SYST BP LT 130 MM HG: CPT | Mod: CPTII,S$GLB,, | Performed by: INTERNAL MEDICINE

## 2023-02-24 PROCEDURE — 99999 PR PBB SHADOW E&M-EST. PATIENT-LVL IV: CPT | Mod: PBBFAC,,, | Performed by: INTERNAL MEDICINE

## 2023-02-24 PROCEDURE — 99214 PR OFFICE/OUTPT VISIT, EST, LEVL IV, 30-39 MIN: ICD-10-PCS | Mod: S$GLB,,, | Performed by: INTERNAL MEDICINE

## 2023-02-24 PROCEDURE — 1160F PR REVIEW ALL MEDS BY PRESCRIBER/CLIN PHARMACIST DOCUMENTED: ICD-10-PCS | Mod: CPTII,S$GLB,, | Performed by: INTERNAL MEDICINE

## 2023-02-24 PROCEDURE — 3079F PR MOST RECENT DIASTOLIC BLOOD PRESSURE 80-89 MM HG: ICD-10-PCS | Mod: CPTII,S$GLB,, | Performed by: INTERNAL MEDICINE

## 2023-02-24 PROCEDURE — 1159F MED LIST DOCD IN RCRD: CPT | Mod: CPTII,S$GLB,, | Performed by: INTERNAL MEDICINE

## 2023-02-24 PROCEDURE — 3074F PR MOST RECENT SYSTOLIC BLOOD PRESSURE < 130 MM HG: ICD-10-PCS | Mod: CPTII,S$GLB,, | Performed by: INTERNAL MEDICINE

## 2023-02-24 PROCEDURE — 3051F PR MOST RECENT HEMOGLOBIN A1C LEVEL 7.0 - < 8.0%: ICD-10-PCS | Mod: CPTII,S$GLB,, | Performed by: INTERNAL MEDICINE

## 2023-02-24 PROCEDURE — 3051F HG A1C>EQUAL 7.0%<8.0%: CPT | Mod: CPTII,S$GLB,, | Performed by: INTERNAL MEDICINE

## 2023-02-24 RX ORDER — BUPROPION HYDROCHLORIDE 150 MG/1
150 TABLET ORAL DAILY
Qty: 30 TABLET | Refills: 0 | Status: SHIPPED | OUTPATIENT
Start: 2023-02-24 | End: 2023-03-13 | Stop reason: SDUPTHER

## 2023-02-24 NOTE — PROGRESS NOTES
[facility] Note    Chief Complaint      Chief Complaint   Patient presents with    Stress    Insomnia       History of Present Illness      Toño Ye is a 57 y.o. male who presents today for   Chief Complaint   Patient presents with    Stress    Insomnia   .  Patient comes to appointment here for acute visit related to above . He is dealing with a social stressor in his life currently related to a son . He is having some issue with stress , insomnia and anxiety .     HPI    No problem-specific Assessment & Plan notes found for this encounter.       Problem List Items Addressed This Visit          Psychiatric    Anxiety and depression - Primary    Overview     Will start buproprion 150 mg   rtc in 1 m             Past Medical History:  Past Medical History:   Diagnosis Date    Hyperlipidemia     Hypertension        Past Surgical History:  Past Surgical History:   Procedure Laterality Date    COLONOSCOPY  11/20/2017    KNEE SURGERY         Family History:  family history includes Arthritis in his mother; Cancer in his father; Depression in his sister; Diabetes in his father; Drug abuse in his brother; Heart disease in his father; Hypertension in his father and mother; Lung cancer in his father.     Social History:  Social History     Socioeconomic History    Marital status:    Tobacco Use    Smoking status: Never    Smokeless tobacco: Never   Substance and Sexual Activity    Alcohol use: Yes     Alcohol/week: 4.0 standard drinks     Types: 2 Glasses of wine, 2 Drinks containing 0.5 oz of alcohol per week    Drug use: Never    Sexual activity: Yes     Partners: Female     Birth control/protection: None       Review of Systems:   Review of Systems   Constitutional:  Negative for fever and weight loss.   HENT:  Negative for congestion, hearing loss and sore throat.    Eyes:  Negative for blurred vision.   Respiratory:  Negative for cough and shortness of breath.   "  Cardiovascular:  Negative for chest pain, palpitations, claudication and leg swelling.   Gastrointestinal:  Negative for abdominal pain, constipation, diarrhea and heartburn.   Genitourinary:  Negative for dysuria.   Musculoskeletal:  Negative for back pain and myalgias.   Skin:  Negative for rash.   Neurological:  Negative for focal weakness and headaches.   Psychiatric/Behavioral:  Negative for depression and suicidal ideas. The patient is not nervous/anxious.       Medications:  Outpatient Encounter Medications as of 2/24/2023   Medication Sig Dispense Refill    atorvastatin (LIPITOR) 20 MG tablet Take 1 tablet (20 mg total) by mouth once daily. 90 tablet 3    BYSTOLIC 10 mg Tab Take 1 tablet (10 mg total) by mouth once daily. 90 tablet 3    fenofibrate 160 MG Tab TAKE 1 TABLET BY MOUTH ONCE DAILY. 90 tablet 3    fluticasone propionate (FLONASE) 50 mcg/actuation nasal spray 1 spray (50 mcg total) by Each Nostril route once daily. 15.8 mL 1    metFORMIN (GLUCOPHAGE-XR) 500 MG ER 24hr tablet Take 1 tablet (500 mg total) by mouth daily with breakfast. 90 tablet 3    buPROPion (WELLBUTRIN XL) 150 MG TB24 tablet Take 1 tablet (150 mg total) by mouth once daily. 30 tablet 0     No facility-administered encounter medications on file as of 2/24/2023.       Allergies:  Review of patient's allergies indicates:  No Known Allergies      Physical Exam      Vitals:    02/24/23 1124   BP: 124/80   Pulse: 75   Resp: 18   Temp: 98.8 °F (37.1 °C)        Vital Signs  Temp: 98.8 °F (37.1 °C)  Temp Source: Oral  Pulse: 75  Resp: 18  SpO2: 97 %  BP: 124/80  BP Location: Right arm  Patient Position: Sitting  Pain Score: 0-No pain  Height and Weight  Height: 5' 6.5" (168.9 cm)  Weight: 112.6 kg (248 lb 3.8 oz)  BSA (Calculated - sq m): 2.3 sq meters  BMI (Calculated): 39.5  Weight in (lb) to have BMI = 25: 156.9]     Body mass index is 39.47 kg/m².    Physical Exam  Constitutional:       Appearance: He is well-developed.   HENT:      " Head: Normocephalic.   Eyes:      Pupils: Pupils are equal, round, and reactive to light.   Neck:      Thyroid: No thyromegaly.   Cardiovascular:      Rate and Rhythm: Normal rate and regular rhythm.      Heart sounds: No murmur heard.    No friction rub. No gallop.   Pulmonary:      Effort: Pulmonary effort is normal.      Breath sounds: Normal breath sounds.   Abdominal:      General: Bowel sounds are normal.      Palpations: Abdomen is soft.   Musculoskeletal:         General: Normal range of motion.      Cervical back: Normal range of motion.   Skin:     General: Skin is warm and dry.   Neurological:      Mental Status: He is alert and oriented to person, place, and time.      Sensory: No sensory deficit.   Psychiatric:         Mood and Affect: Mood is anxious and depressed.         Behavior: Behavior normal.        Laboratory:  CBC:  Recent Labs   Lab Result Units 01/17/23  0724   WBC K/uL 5.58   RBC M/uL 5.22   Hemoglobin g/dL 15.1   Hematocrit % 46.2   Platelets K/uL 320   MCV fL 89   MCH pg 28.9   MCHC g/dL 32.7     CMP:  Recent Labs   Lab Result Units 01/17/23  0724   Glucose mg/dL 201*   Calcium mg/dL 10.2   Albumin g/dL 4.2   Total Protein g/dL 7.7   Sodium mmol/L 137   Potassium mmol/L 4.2   CO2 mmol/L 26   Chloride mmol/L 103   BUN mg/dL 23*   Alkaline Phosphatase U/L 73   ALT U/L 127*   AST U/L 75*   Total Bilirubin mg/dL 0.8     URINALYSIS:  No results for input(s): COLORU, CLARITYU, SPECGRAV, PHUR, PROTEINUA, GLUCOSEU, BILIRUBINCON, BLOODU, WBCU, RBCU, BACTERIA, MUCUS, NITRITE, LEUKOCYTESUR, UROBILINOGEN, HYALINECASTS in the last 2160 hours.   LIPIDS:  Recent Labs   Lab Result Units 01/17/23  0724   HDL mg/dL 29*   Cholesterol mg/dL 159   Triglycerides mg/dL 342*   LDL Cholesterol mg/dL 61.6*   HDL/Cholesterol Ratio % 18.2*   Non-HDL Cholesterol mg/dL 130   Total Cholesterol/HDL Ratio  5.5*     TSH:  No results for input(s): TSH in the last 2160 hours.  A1C:  Recent Labs   Lab Result Units  01/17/23  0724 02/23/23  0949   Hemoglobin A1C % 8.4* 7.5*       Radiology:        Assessment:     Toño Ye is a 57 y.o.male with:    Anxiety and depression  -     buPROPion (WELLBUTRIN XL) 150 MG TB24 tablet; Take 1 tablet (150 mg total) by mouth once daily.  Dispense: 30 tablet; Refill: 0                Plan:     Problem List Items Addressed This Visit          Psychiatric    Anxiety and depression - Primary    Overview     Will start buproprion 150 mg   rtc in 1 m            As above, continue current medications and maintain follow up with specialists.  Return to clinic in 6 months.      Frederick W Dantagnan [facility]

## 2023-03-13 ENCOUNTER — PATIENT MESSAGE (OUTPATIENT)
Dept: PRIMARY CARE CLINIC | Facility: CLINIC | Age: 58
End: 2023-03-13
Payer: COMMERCIAL

## 2023-03-13 DIAGNOSIS — F32.A ANXIETY AND DEPRESSION: ICD-10-CM

## 2023-03-13 DIAGNOSIS — F41.9 ANXIETY AND DEPRESSION: ICD-10-CM

## 2023-03-13 RX ORDER — BUPROPION HYDROCHLORIDE 300 MG/1
300 TABLET ORAL DAILY
Qty: 30 TABLET | Refills: 5 | Status: SHIPPED | OUTPATIENT
Start: 2023-03-13 | End: 2023-08-22

## 2023-03-24 ENCOUNTER — OFFICE VISIT (OUTPATIENT)
Dept: PRIMARY CARE CLINIC | Facility: CLINIC | Age: 58
End: 2023-03-24
Payer: COMMERCIAL

## 2023-03-24 DIAGNOSIS — G47.00 INSOMNIA, UNSPECIFIED TYPE: Primary | ICD-10-CM

## 2023-03-24 DIAGNOSIS — F32.A ANXIETY AND DEPRESSION: ICD-10-CM

## 2023-03-24 DIAGNOSIS — F41.9 ANXIETY AND DEPRESSION: ICD-10-CM

## 2023-03-24 PROCEDURE — 1159F PR MEDICATION LIST DOCUMENTED IN MEDICAL RECORD: ICD-10-PCS | Mod: CPTII,95,, | Performed by: INTERNAL MEDICINE

## 2023-03-24 PROCEDURE — 99214 PR OFFICE/OUTPT VISIT, EST, LEVL IV, 30-39 MIN: ICD-10-PCS | Mod: 95,,, | Performed by: INTERNAL MEDICINE

## 2023-03-24 PROCEDURE — 3051F HG A1C>EQUAL 7.0%<8.0%: CPT | Mod: CPTII,95,, | Performed by: INTERNAL MEDICINE

## 2023-03-24 PROCEDURE — 1159F MED LIST DOCD IN RCRD: CPT | Mod: CPTII,95,, | Performed by: INTERNAL MEDICINE

## 2023-03-24 PROCEDURE — 99214 OFFICE O/P EST MOD 30 MIN: CPT | Mod: 95,,, | Performed by: INTERNAL MEDICINE

## 2023-03-24 PROCEDURE — 1160F RVW MEDS BY RX/DR IN RCRD: CPT | Mod: CPTII,95,, | Performed by: INTERNAL MEDICINE

## 2023-03-24 PROCEDURE — 3051F PR MOST RECENT HEMOGLOBIN A1C LEVEL 7.0 - < 8.0%: ICD-10-PCS | Mod: CPTII,95,, | Performed by: INTERNAL MEDICINE

## 2023-03-24 PROCEDURE — 1160F PR REVIEW ALL MEDS BY PRESCRIBER/CLIN PHARMACIST DOCUMENTED: ICD-10-PCS | Mod: CPTII,95,, | Performed by: INTERNAL MEDICINE

## 2023-03-24 RX ORDER — TRAZODONE HYDROCHLORIDE 100 MG/1
100 TABLET ORAL NIGHTLY
Qty: 30 TABLET | Refills: 1 | Status: SHIPPED | OUTPATIENT
Start: 2023-03-24 | End: 2023-04-17

## 2023-03-24 NOTE — PROGRESS NOTES
The patient location is: vehicle  The chief complaint leading to consultation is: medication management     Visit type: audiovisual    Face to Face time with patient: 10 min  15 minutes of total time spent on the encounter, which includes face to face time and non-face to face time preparing to see the patient (eg, review of tests), Obtaining and/or reviewing separately obtained history, Documenting clinical information in the electronic or other health record, Independently interpreting results (not separately reported) and communicating results to the patient/family/caregiver, or Care coordination (not separately reported).         Each patient to whom he or she provides medical services by telemedicine is:  (1) informed of the relationship between the physician and patient and the respective role of any other health care provider with respect to management of the patient; and (2) notified that he or she may decline to receive medical services by telemedicine and may withdraw from such care at any time.    Notes:  [facility] Note    Chief Complaint      Chief Complaint   Patient presents with    Medication Management       History of Present Illness      Toño Ye is a 57 y.o. male who presents today for   Chief Complaint   Patient presents with    Medication Management   .  Patient comes to appointment here for f/u after increasing welbutrin to 300 mg . He is feeling much better but is having some issues with falling asleep     HPI    No problem-specific Assessment & Plan notes found for this encounter.       Problem List Items Addressed This Visit          Psychiatric    Anxiety and depression    Overview     Increased to buproprion 300 mg is doing much better . Will continue . Keep appt as scheduled in 2 m            Other    Insomnia - Primary    Overview     Trazodone 100 m g po qhs              Past Medical History:  Past Medical History:   Diagnosis Date     Hyperlipidemia     Hypertension        Past Surgical History:  Past Surgical History:   Procedure Laterality Date    COLONOSCOPY  11/20/2017    KNEE SURGERY         Family History:  family history includes Arthritis in his mother; Cancer in his father; Depression in his sister; Diabetes in his father; Drug abuse in his brother; Heart disease in his father; Hypertension in his father and mother; Lung cancer in his father.     Social History:  Social History     Socioeconomic History    Marital status:    Tobacco Use    Smoking status: Never    Smokeless tobacco: Never   Substance and Sexual Activity    Alcohol use: Yes     Alcohol/week: 4.0 standard drinks     Types: 2 Glasses of wine, 2 Drinks containing 0.5 oz of alcohol per week    Drug use: Never    Sexual activity: Yes     Partners: Female     Birth control/protection: None       Review of Systems:   Review of Systems   HENT:  Negative for hearing loss.    Eyes:  Negative for discharge.   Respiratory:  Negative for wheezing.    Cardiovascular:  Negative for chest pain and palpitations.   Gastrointestinal:  Negative for blood in stool, constipation, diarrhea and vomiting.   Genitourinary:  Negative for hematuria and urgency.   Musculoskeletal:  Negative for neck pain.   Neurological:  Negative for weakness and headaches.   Endo/Heme/Allergies:  Negative for polydipsia.      Medications:  Outpatient Encounter Medications as of 3/24/2023   Medication Sig Dispense Refill    atorvastatin (LIPITOR) 20 MG tablet Take 1 tablet (20 mg total) by mouth once daily. 90 tablet 3    buPROPion (WELLBUTRIN XL) 300 MG 24 hr tablet Take 1 tablet (300 mg total) by mouth once daily. 30 tablet 5    BYSTOLIC 10 mg Tab Take 1 tablet (10 mg total) by mouth once daily. 90 tablet 3    fenofibrate 160 MG Tab TAKE 1 TABLET BY MOUTH ONCE DAILY. 90 tablet 3    fluticasone propionate (FLONASE) 50 mcg/actuation nasal spray 1 spray (50 mcg total) by Each Nostril route once daily. 15.8 mL  1    metFORMIN (GLUCOPHAGE-XR) 500 MG ER 24hr tablet Take 1 tablet (500 mg total) by mouth daily with breakfast. 90 tablet 3    traZODone (DESYREL) 100 MG tablet Take 1 tablet (100 mg total) by mouth every evening. 30 tablet 1    [DISCONTINUED] buPROPion (WELLBUTRIN XL) 150 MG TB24 tablet Take 1 tablet (150 mg total) by mouth once daily. 30 tablet 0     No facility-administered encounter medications on file as of 3/24/2023.       Allergies:  Review of patient's allergies indicates:  No Known Allergies      Physical Exam      There were no vitals filed for this visit.      ]     There is no height or weight on file to calculate BMI.    Physical Exam  Constitutional:       General: He is not in acute distress.     Appearance: Normal appearance. He is not ill-appearing.   Eyes:      General:         Right eye: No discharge.         Left eye: No discharge.      Extraocular Movements: Extraocular movements intact.      Pupils: Pupils are equal, round, and reactive to light.   Pulmonary:      Effort: Pulmonary effort is normal.   Neurological:      General: No focal deficit present.      Mental Status: He is alert and oriented to person, place, and time.   Psychiatric:         Mood and Affect: Mood normal.         Behavior: Behavior normal.         Thought Content: Thought content normal.        Laboratory:  CBC:  Recent Labs   Lab Result Units 01/17/23  0724   WBC K/uL 5.58   RBC M/uL 5.22   Hemoglobin g/dL 15.1   Hematocrit % 46.2   Platelets K/uL 320   MCV fL 89   MCH pg 28.9   MCHC g/dL 32.7     CMP:  Recent Labs   Lab Result Units 01/17/23  0724   Glucose mg/dL 201*   Calcium mg/dL 10.2   Albumin g/dL 4.2   Total Protein g/dL 7.7   Sodium mmol/L 137   Potassium mmol/L 4.2   CO2 mmol/L 26   Chloride mmol/L 103   BUN mg/dL 23*   Alkaline Phosphatase U/L 73   ALT U/L 127*   AST U/L 75*   Total Bilirubin mg/dL 0.8     URINALYSIS:  No results for input(s): COLORU, CLARITYU, SPECGRAV, PHUR, PROTEINUA, GLUCOSEU,  BILIRUBINCON, BLOODU, WBCU, RBCU, BACTERIA, MUCUS, NITRITE, LEUKOCYTESUR, UROBILINOGEN, HYALINECASTS in the last 2160 hours.   LIPIDS:  Recent Labs   Lab Result Units 01/17/23  0724   HDL mg/dL 29*   Cholesterol mg/dL 159   Triglycerides mg/dL 342*   LDL Cholesterol mg/dL 61.6*   HDL/Cholesterol Ratio % 18.2*   Non-HDL Cholesterol mg/dL 130   Total Cholesterol/HDL Ratio  5.5*     TSH:  No results for input(s): TSH in the last 2160 hours.  A1C:  Recent Labs   Lab Result Units 01/17/23  0724 02/23/23  0949   Hemoglobin A1C % 8.4* 7.5*       Radiology:        Assessment:     Toño Ye is a 57 y.o.male with:    Insomnia, unspecified type  -     traZODone (DESYREL) 100 MG tablet; Take 1 tablet (100 mg total) by mouth every evening.  Dispense: 30 tablet; Refill: 1    Anxiety and depression                Plan:     Problem List Items Addressed This Visit          Psychiatric    Anxiety and depression    Overview     Increased to buproprion 300 mg is doing much better . Will continue . Keep appt as scheduled in 2 m            Other    Insomnia - Primary    Overview     Trazodone 100 m g po qhs             As above, continue current medications and maintain follow up with specialists.  Return to clinic as scheduled       Frederick W Dantagnan [facility]                Answers submitted by the patient for this visit:  Review of Systems Questionnaire (Submitted on 3/24/2023)  activity change: Yes  unexpected weight change: No  rhinorrhea: No  trouble swallowing: No  visual disturbance: No  chest tightness: No  polyuria: No  difficulty urinating: No  joint swelling: No  arthralgias: No  confusion: No  dysphoric mood: Yes

## 2023-04-03 ENCOUNTER — PATIENT MESSAGE (OUTPATIENT)
Dept: ADMINISTRATIVE | Facility: HOSPITAL | Age: 58
End: 2023-04-03
Payer: COMMERCIAL

## 2023-04-05 ENCOUNTER — CLINICAL SUPPORT (OUTPATIENT)
Dept: ENDOSCOPY | Facility: HOSPITAL | Age: 58
End: 2023-04-05
Attending: INTERNAL MEDICINE
Payer: COMMERCIAL

## 2023-04-05 VITALS — BODY MASS INDEX: 39.37 KG/M2 | WEIGHT: 245 LBS | HEIGHT: 66 IN

## 2023-04-05 DIAGNOSIS — Z86.010 HISTORY OF COLON POLYPS: Primary | ICD-10-CM

## 2023-04-05 DIAGNOSIS — Z12.11 ENCOUNTER FOR SCREENING COLONOSCOPY: ICD-10-CM

## 2023-04-05 RX ORDER — POLYETHYLENE GLYCOL 3350, SODIUM SULFATE ANHYDROUS, SODIUM BICARBONATE, SODIUM CHLORIDE, POTASSIUM CHLORIDE 236; 22.74; 6.74; 5.86; 2.97 G/4L; G/4L; G/4L; G/4L; G/4L
4 POWDER, FOR SOLUTION ORAL ONCE
Qty: 4000 ML | Refills: 0 | Status: SHIPPED | OUTPATIENT
Start: 2023-04-05 | End: 2023-04-05

## 2023-04-24 PROBLEM — Z00.00 ANNUAL PHYSICAL EXAM: Status: RESOLVED | Noted: 2023-01-19 | Resolved: 2023-04-24

## 2023-05-08 ENCOUNTER — TELEPHONE (OUTPATIENT)
Dept: PRIMARY CARE CLINIC | Facility: CLINIC | Age: 58
End: 2023-05-08
Payer: COMMERCIAL

## 2023-05-08 DIAGNOSIS — E11.9 DIABETES MELLITUS WITHOUT COMPLICATION: Primary | ICD-10-CM

## 2023-05-08 NOTE — TELEPHONE ENCOUNTER
----- Message from Manolo Arias sent at 5/8/2023  2:57 PM CDT -----  Regarding: Request Appointment Diabetes ASAP  Good afternoon,     Patient is requesting a callback ASAP today to rescheduled appointment in 7 days to regarding A!C , Diabetes concerned. Patient advised canceled appt previous due to  out of town, and flights canceled. Patient is currently on 10/23/23 and cannot wait that long with Diabetes condition. Please give the patient a callback at [phone] .      Thank you ,    KELSEY Arias

## 2023-05-09 DIAGNOSIS — G47.00 INSOMNIA, UNSPECIFIED TYPE: ICD-10-CM

## 2023-05-09 RX ORDER — TRAZODONE HYDROCHLORIDE 100 MG/1
TABLET ORAL
Qty: 30 TABLET | Refills: 1 | Status: SHIPPED | OUTPATIENT
Start: 2023-05-09 | End: 2023-06-07

## 2023-05-09 NOTE — TELEPHONE ENCOUNTER
Refill Routing Note   Medication(s) are not appropriate for processing by [facility] for the following reason(s):      New or recently adjusted medication    [facility] action(s):  Defer None identified          Appointments  past 12m or future 3m with PCP    Date Provider   Last Visit   3/24/2023 Peter Person MD   Next Visit   5/22/2023 Peter Person MD   ED visits in past 90 days: 0        Note composed:9:11 AM 05/09/2023

## 2023-05-09 NOTE — TELEPHONE ENCOUNTER
No care due was identified.  [facility] Embedded Care Due Messages. Reference number: [id number].   5/09/2023 8:32:14 AM CDT

## 2023-05-15 ENCOUNTER — TELEPHONE (OUTPATIENT)
Dept: ENDOSCOPY | Facility: HOSPITAL | Age: 58
End: 2023-05-15
Payer: COMMERCIAL

## 2023-05-15 NOTE — TELEPHONE ENCOUNTER
----- Message from Namrata Segovia RN sent at 5/15/2023  9:00 AM CDT -----    ----- Message -----  From: Oswald Franco  Sent: 5/15/2023   8:37 AM CDT  To: [facility] Staff        Name of Who is Calling:PT          What is the request in detail:PT is requesting a call back to discuss what time should he start taking his laxative for his upcoming procedure. Please be Advised!          Can the clinic reply by [facility]:no          What Number to Call Back if not in [phone]

## 2023-05-15 NOTE — TELEPHONE ENCOUNTER
Spoke with patient. Reviewed prep instructions and answered questions. Patient verbalized understanding.

## 2023-05-17 ENCOUNTER — ANESTHESIA (OUTPATIENT)
Dept: ENDOSCOPY | Facility: HOSPITAL | Age: 58
End: 2023-05-17
Payer: COMMERCIAL

## 2023-05-17 ENCOUNTER — HOSPITAL ENCOUNTER (OUTPATIENT)
Facility: HOSPITAL | Age: 58
Discharge: HOME | End: 2023-05-17
Attending: STUDENT IN AN ORGANIZED HEALTH CARE EDUCATION/TRAINING PROGRAM | Admitting: STUDENT IN AN ORGANIZED HEALTH CARE EDUCATION/TRAINING PROGRAM
Payer: COMMERCIAL

## 2023-05-17 ENCOUNTER — ANESTHESIA EVENT (OUTPATIENT)
Dept: ENDOSCOPY | Facility: HOSPITAL | Age: 58
End: 2023-05-17
Payer: COMMERCIAL

## 2023-05-17 VITALS
BODY MASS INDEX: 37.35 KG/M2 | RESPIRATION RATE: 18 BRPM | HEART RATE: 61 BPM | SYSTOLIC BLOOD PRESSURE: 120 MMHG | WEIGHT: 238 LBS | DIASTOLIC BLOOD PRESSURE: 68 MMHG | TEMPERATURE: 98 F | HEIGHT: 67 IN | OXYGEN SATURATION: 95 %

## 2023-05-17 DIAGNOSIS — Z12.11 ENCOUNTER FOR SCREENING COLONOSCOPY: Primary | ICD-10-CM

## 2023-05-17 PROCEDURE — 88305 TISSUE EXAM BY PATHOLOGIST: CPT | Performed by: PATHOLOGY

## 2023-05-17 PROCEDURE — 27201012 HC FORCEPS, HOT/COLD, DISP: Performed by: STUDENT IN AN ORGANIZED HEALTH CARE EDUCATION/TRAINING PROGRAM

## 2023-05-17 PROCEDURE — 25000003 PHARM REV CODE 250: Performed by: NURSE ANESTHETIST, CERTIFIED REGISTERED

## 2023-05-17 PROCEDURE — 37000009 HC ANESTHESIA EA ADD 15 MINS: Performed by: STUDENT IN AN ORGANIZED HEALTH CARE EDUCATION/TRAINING PROGRAM

## 2023-05-17 PROCEDURE — 45380 PR COLONOSCOPY,BIOPSY: ICD-10-PCS | Mod: 33,,, | Performed by: STUDENT IN AN ORGANIZED HEALTH CARE EDUCATION/TRAINING PROGRAM

## 2023-05-17 PROCEDURE — E9220 PRA ENDO ANESTHESIA: HCPCS | Mod: 33,,, | Performed by: NURSE ANESTHETIST, CERTIFIED REGISTERED

## 2023-05-17 PROCEDURE — 82962 GLUCOSE BLOOD TEST: CPT | Performed by: STUDENT IN AN ORGANIZED HEALTH CARE EDUCATION/TRAINING PROGRAM

## 2023-05-17 PROCEDURE — 88305 TISSUE EXAM BY PATHOLOGIST: CPT | Mod: 26,,, | Performed by: PATHOLOGY

## 2023-05-17 PROCEDURE — 37000008 HC ANESTHESIA 1ST 15 MINUTES: Performed by: STUDENT IN AN ORGANIZED HEALTH CARE EDUCATION/TRAINING PROGRAM

## 2023-05-17 PROCEDURE — 63600175 PHARM REV CODE 636 W HCPCS: Performed by: NURSE ANESTHETIST, CERTIFIED REGISTERED

## 2023-05-17 PROCEDURE — 88305 TISSUE EXAM BY PATHOLOGIST: ICD-10-PCS | Mod: 26,,, | Performed by: PATHOLOGY

## 2023-05-17 PROCEDURE — 45380 COLONOSCOPY AND BIOPSY: CPT | Mod: 33,,, | Performed by: STUDENT IN AN ORGANIZED HEALTH CARE EDUCATION/TRAINING PROGRAM

## 2023-05-17 PROCEDURE — 45380 COLONOSCOPY AND BIOPSY: CPT | Mod: PT | Performed by: STUDENT IN AN ORGANIZED HEALTH CARE EDUCATION/TRAINING PROGRAM

## 2023-05-17 PROCEDURE — E9220 PRA ENDO ANESTHESIA: ICD-10-PCS | Mod: 33,,, | Performed by: NURSE ANESTHETIST, CERTIFIED REGISTERED

## 2023-05-17 RX ORDER — SODIUM CHLORIDE 9 MG/ML
INJECTION, SOLUTION INTRAVENOUS CONTINUOUS
Status: DISCONTINUED | OUTPATIENT
Start: 2023-05-17 | End: 2023-05-17 | Stop reason: HOSPADM

## 2023-05-17 RX ORDER — PROPOFOL 10 MG/ML
VIAL (ML) INTRAVENOUS CONTINUOUS PRN
Status: DISCONTINUED | OUTPATIENT
Start: 2023-05-17 | End: 2023-05-17

## 2023-05-17 RX ORDER — PROPOFOL 10 MG/ML
VIAL (ML) INTRAVENOUS
Status: DISCONTINUED | OUTPATIENT
Start: 2023-05-17 | End: 2023-05-17

## 2023-05-17 RX ORDER — LIDOCAINE HYDROCHLORIDE 20 MG/ML
INJECTION INTRAVENOUS
Status: DISCONTINUED | OUTPATIENT
Start: 2023-05-17 | End: 2023-05-17

## 2023-05-17 RX ADMIN — PROPOFOL 10 MG: 10 INJECTION, EMULSION INTRAVENOUS at 09:05

## 2023-05-17 RX ADMIN — Medication 200 MCG/KG/MIN: at 09:05

## 2023-05-17 RX ADMIN — PROPOFOL 30 MG: 10 INJECTION, EMULSION INTRAVENOUS at 09:05

## 2023-05-17 RX ADMIN — SODIUM CHLORIDE: 0.9 INJECTION, SOLUTION INTRAVENOUS at 09:05

## 2023-05-17 RX ADMIN — LIDOCAINE HYDROCHLORIDE 60 MG: 20 INJECTION INTRAVENOUS at 09:05

## 2023-05-17 NOTE — H&P
[facility]  Colon and Rectal Surgery    [address]  [place], [place]  Tel: [phone]  Fax: [phone]  [email or web address]   MD Gregg Christianson MD Brian Kann, MD W. Forrest Johnston, MD Matthew Giglia, MD Jennifer Paruch, MD William Kethman, MD Danielle Kay, MD     Patient name: Toño Ye   YOB: 1965   MRN: [id number]  Date of procedure: 05/17/2023    Procedure: Colonoscopy  Indications: Previous adenomatous polyp and Screening for colon cancer, no family history of CRC    The patient was informed of the availability of a certified  without charge. A certified  was not necessary for this visit.    Sedation plan: MAC  ASA: ASA 2 - Patient with mild systemic disease with no functional limitations    Review of Systems  See above    Past Medical History:   Diagnosis Date    Diabetes mellitus     Hyperlipidemia     Hypertension      Past Surgical History:   Procedure Laterality Date    COLONOSCOPY  11/20/2017    KNEE SURGERY       Family History   Problem Relation Age of Onset    Hypertension Mother     Arthritis Mother     Hypertension Father     Lung cancer Father     Cancer Father         Lung Cancer    Diabetes Father     Heart disease Father         Irregular Heart Beat    Depression Sister     Drug abuse Brother      Social History     Tobacco Use    Smoking status: Never    Smokeless tobacco: Never   Substance Use Topics    Alcohol use: Yes     Alcohol/week: 4.0 standard drinks     Types: 2 Glasses of wine, 2 Drinks containing 0.5 oz of alcohol per week    Drug use: Never     Review of patient's allergies indicates:  No Known Allergies    Prior to Admission medications    Medication Sig Start Date End Date Taking? Authorizing Provider   atorvastatin (LIPITOR) 20 MG tablet Take 1 tablet (20 mg total) by mouth once daily. 11/7/22  Yes Peter Person MD   BYSTOLIC 10 mg Tab Take 1  "tablet (10 mg total) by mouth once daily. 1/19/23  Yes Peter Person MD   fenofibrate 160 MG Tab TAKE 1 TABLET BY MOUTH ONCE DAILY. 11/18/22  Yes Peter Person MD   metFORMIN (GLUCOPHAGE-XR) 500 MG ER 24hr tablet Take 1 tablet (500 mg total) by mouth daily with breakfast. 1/19/23  Yes Peter Person MD   traZODone (DESYREL) 100 MG tablet TAKE 1 TABLET BY MOUTH EVERY DAY IN THE EVENING 5/9/23  Yes Peter Person MD   buPROPion (WELLBUTRIN XL) 300 MG 24 hr tablet Take 1 tablet (300 mg total) by mouth once daily. 3/13/23 3/12/24  Peter Person MD   fluticasone propionate (FLONASE) 50 mcg/actuation nasal spray 1 spray (50 mcg total) by Each Nostril route once daily. 7/13/20   Mishel Ford, [place]       Physical Examination  BP (!) 160/82 (BP Location: Left arm, Patient Position: Lying)   Pulse 72   Temp 98.4 °F (36.9 °C) (Temporal)   Resp 18   Ht 5' 6.5" (1.689 m)   Wt 108 kg (238 lb)   SpO2 100%   BMI 37.84 kg/m²      Constitutional: well developed, no cough, no dyspnea, alert, and no acute distress    Head: Normocephalic, no lesions, without obvious abnormality  Eye: Normal external eye, conjunctiva, and lids, PERRL  Cardiovascular: regular rate and regular rhythm  Respiratory: normal air entry  Gastrointestinal: soft, non-tender, without masses or organomegaly  Neurologic: alert, oriented, normal speech, no focal findings or movement disorder noted  Psychiatric: appropriate, normal mood    Plan of Care    It was a pleasure meeting Mr. Ye today - we will plan to perform a colonoscopy with monitored anesthesia care. The details of the procedure, the possible need for biopsy or polypectomy and the potential risks including bleeding, perforation, missed polyps were discussed in detail and they consented to undergo the procedure.      Simone Lemus MD, FACS - Staff Surgeon  Department of Colon & Rectal Surgery  [facility]    "

## 2023-05-17 NOTE — TRANSFER OF CARE
"Anesthesia Transfer of Care Note    Patient: Toño Ye    Procedure(s) Performed: Procedure(s) (LRB):  COLONOSCOPY (N/A)    Patient location: PACU    Anesthesia Type: general    Transport from OR: Transported from OR on room air with adequate spontaneous ventilation    Post pain: adequate analgesia    Post assessment: no apparent anesthetic complications and tolerated procedure well    Post vital signs: stable    Level of consciousness: responds to stimulation    Nausea/Vomiting: no vomiting    Complications: none    Transfer of care protocol was followed      Last vitals:   Visit Vitals  BP (!) 100/56 (BP Location: Left arm, Patient Position: Lying)   Pulse 66   Temp 36.9 °C (98.4 °F) (Oral)   Resp 16   Ht 5' 6.5" (1.689 m)   Wt 108 kg (238 lb)   SpO2 95%   BMI 37.84 kg/m²     "

## 2023-05-17 NOTE — ANESTHESIA PREPROCEDURE EVALUATION
05/17/2023  Pre-operative evaluation for Procedure(s) (LRB):  COLONOSCOPY (N/A)    Toño Ye is a 57 y.o. male     Patient Active Problem List   Diagnosis    Encounter for hepatitis C screening test for low risk patient    Prostate cancer screening    Diabetes mellitus without complication    Morbid obesity    ISAIAH (obstructive sleep apnea)    Encounter for screening colonoscopy    Anxiety and depression    Insomnia       Review of patient's allergies indicates:  No Known Allergies    No current facility-administered medications on file prior to encounter.     Current Outpatient Medications on File Prior to Encounter   Medication Sig Dispense Refill    atorvastatin (LIPITOR) 20 MG tablet Take 1 tablet (20 mg total) by mouth once daily. 90 tablet 3    BYSTOLIC 10 mg Tab Take 1 tablet (10 mg total) by mouth once daily. 90 tablet 3    fenofibrate 160 MG Tab TAKE 1 TABLET BY MOUTH ONCE DAILY. 90 tablet 3    metFORMIN (GLUCOPHAGE-XR) 500 MG ER 24hr tablet Take 1 tablet (500 mg total) by mouth daily with breakfast. 90 tablet 3    buPROPion (WELLBUTRIN XL) 300 MG 24 hr tablet Take 1 tablet (300 mg total) by mouth once daily. 30 tablet 5    fluticasone propionate (FLONASE) 50 mcg/actuation nasal spray 1 spray (50 mcg total) by Each Nostril route once daily. 15.8 mL 1       Past Surgical History:   Procedure Laterality Date    COLONOSCOPY  11/20/2017    KNEE SURGERY         Social History     Socioeconomic History    Marital status:    Tobacco Use    Smoking status: Never    Smokeless tobacco: Never   Substance and Sexual Activity    Alcohol use: Yes     Alcohol/week: 4.0 standard drinks     Types: 2 Glasses of wine, 2 Drinks containing 0.5 oz of alcohol per week    Drug use: Never    Sexual activity: Yes     Partners: Female     Birth control/protection: None              Pre-op Assessment    I have reviewed the Patient Summary Reports.     I have reviewed the Nursing Notes. I have reviewed the NPO Status.      Review of Systems  Cardiovascular:   Hypertension    Pulmonary:   Sleep Apnea    Endocrine:   Diabetes        Physical Exam    Airway:  Mouth Opening: Normal  Tongue: Normal    Chest/Lungs:  Normal Respiratory Rate    Heart:  Rhythm: Regular Rhythm        Anesthesia Plan  Type of Anesthesia, risks & benefits discussed:    Anesthesia Type: Gen Natural Airway  Intra-op Monitoring Plan: Standard ASA Monitors  Induction:  IV  Informed Consent: Informed consent signed with the Patient and all parties understand the risks and agree with anesthesia plan.  All questions answered.   ASA Score: 3    Ready For Surgery From Anesthesia Perspective.     .

## 2023-05-17 NOTE — ANESTHESIA POSTPROCEDURE EVALUATION
Anesthesia Post Evaluation    Patient: Toño Ye    Procedure(s) Performed: Procedure(s) (LRB):  COLONOSCOPY (N/A)    Final Anesthesia Type: general      Patient location during evaluation: PACU  Patient participation: Yes- Able to Participate  Level of consciousness: awake and alert  Post-procedure vital signs: reviewed and stable  Pain management: adequate  Airway patency: patent    PONV status at discharge: No PONV  Anesthetic complications: no      Cardiovascular status: blood pressure returned to baseline  Respiratory status: unassisted  Follow-up not needed.          Vitals Value Taken Time   /68 05/17/23 0957   Temp 36.9 °C (98.4 °F) 05/17/23 0933   Pulse 61 05/17/23 0957   Resp 18 05/17/23 0957   SpO2 95 % 05/17/23 0957         Event Time   Out of Recovery 10:00:16         Pain/Dahlia Score: Dahlia Score: 10 (5/17/2023  9:45 AM)

## 2023-05-17 NOTE — PROVATION PATIENT INSTRUCTIONS
Discharge Summary/Instructions after an Endoscopic Procedure  Patient Name: Toño Ye  Patient MRN: [id number]  Patient YOB: 1965  Wednesday, May 17, 2023  Simone Lemus MD  Dear patient,  As a result of recent federal legislation (The Federal Cures Act), you may   receive lab or pathology results from your procedure in your [facility]   account before your physician is able to contact you. Your physician or   their representative will relay the results to you with their   recommendations at their soonest availability.  Thank you,  RESTRICTIONS:  During your procedure today, you received medications for sedation.  These   medications may affect your judgment, balance and coordination.  Therefore,   for 24 hours, you have the following restrictions:   - DO NOT drive a car, operate machinery, make legal/financial decisions,   sign important papers or drink alcohol.    ACTIVITY:  Today: no heavy lifting, straining or running due to procedural   sedation/anesthesia.  The following day: return to full activity including work.  DIET:  Eat and drink normally unless instructed otherwise.     TREATMENT FOR COMMON SIDE EFFECTS:  - Mild abdominal pain, nausea, belching, bloating or excessive gas:  rest,   eat lightly and use a heating pad.  - Sore Throat: treat with throat lozenges and/or gargle with warm salt   water.  - Because air was used during the procedure, expelling large amounts of air   from your rectum or belching is normal.  - If a bowel prep was taken, you may not have a bowel movement for 1-3 days.    This is normal.  SYMPTOMS TO WATCH FOR AND REPORT TO YOUR PHYSICIAN:  1. Abdominal pain or bloating, other than gas cramps.  2. Chest pain.  3. Back pain.  4. Signs of infection such as: chills or fever occurring within 24 hours   after the procedure.  5. Rectal bleeding, which would show as bright red, maroon, or black stools.   (A tablespoon of blood from the rectum is not serious, especially  if   hemorrhoids are present.)  6. Vomiting.  7. Weakness or dizziness.  GO DIRECTLY TO THE NEAREST EMERGENCY ROOM IF YOU HAVE ANY OF THE FOLLOWING:      Difficulty breathing              Chills and/or fever over 101 F   Persistent vomiting and/or vomiting blood   Severe abdominal pain   Severe chest pain   Black, tarry stools   Bleeding- more than one tablespoon   Any other symptom or condition that you feel may need urgent attention  Your doctor recommends these additional instructions:  If any biopsies were taken, your doctors clinic will contact you in 1 to 2   weeks with any results.  - Discharge patient to home.   - Resume previous diet.   - Continue present medications.   - Await pathology results.   - Repeat colonoscopy for surveillance based on pathology results.   - Return to referring physician as previously scheduled.  For questions, problems or results please call your physician - Simone Lemus MD at Work:  [phone].  [facility] EMERGENCY ROOM PHONE NUMBER: [phone]  IF A COMPLICATION OR EMERGENCY SITUATION ARISES AND YOU ARE UNABLE TO REACH   YOUR PHYSICIAN - GO DIRECTLY TO THE EMERGENCY ROOM.  MD Simone Mcmillan MD  5/17/2023 9:29:54 AM  This report has been verified and signed electronically.  Dear patient,  As a result of recent federal legislation (The Federal Cures Act), you may   receive lab or pathology results from your procedure in your [facility]   account before your physician is able to contact you. Your physician or   their representative will relay the results to you with their   recommendations at their soonest availability.  Thank you,  PROVATION

## 2023-05-19 ENCOUNTER — LAB VISIT (OUTPATIENT)
Dept: LAB | Facility: HOSPITAL | Age: 58
End: 2023-05-19
Attending: INTERNAL MEDICINE
Payer: COMMERCIAL

## 2023-05-19 DIAGNOSIS — E11.9 TYPE 2 DIABETES MELLITUS WITHOUT COMPLICATION: ICD-10-CM

## 2023-05-19 LAB
ALBUMIN/CREAT UR: 6 UG/MG (ref 0–30)
CREAT UR-MCNC: 100 MG/DL (ref 23–375)
MICROALBUMIN UR DL<=1MG/L-MCNC: 6 UG/ML

## 2023-05-19 PROCEDURE — 82570 ASSAY OF URINE CREATININE: CPT | Performed by: INTERNAL MEDICINE

## 2023-05-22 ENCOUNTER — OFFICE VISIT (OUTPATIENT)
Dept: PRIMARY CARE CLINIC | Facility: CLINIC | Age: 58
End: 2023-05-22
Payer: COMMERCIAL

## 2023-05-22 VITALS
HEIGHT: 67 IN | DIASTOLIC BLOOD PRESSURE: 80 MMHG | OXYGEN SATURATION: 96 % | RESPIRATION RATE: 18 BRPM | WEIGHT: 247.13 LBS | SYSTOLIC BLOOD PRESSURE: 120 MMHG | HEART RATE: 72 BPM | BODY MASS INDEX: 38.79 KG/M2 | TEMPERATURE: 97 F

## 2023-05-22 DIAGNOSIS — F32.A ANXIETY AND DEPRESSION: ICD-10-CM

## 2023-05-22 DIAGNOSIS — G47.33 OSA (OBSTRUCTIVE SLEEP APNEA): ICD-10-CM

## 2023-05-22 DIAGNOSIS — E66.01 MORBID OBESITY: ICD-10-CM

## 2023-05-22 DIAGNOSIS — F41.9 ANXIETY AND DEPRESSION: ICD-10-CM

## 2023-05-22 DIAGNOSIS — E11.9 DIABETES MELLITUS WITHOUT COMPLICATION: Primary | ICD-10-CM

## 2023-05-22 PROCEDURE — 3044F PR MOST RECENT HEMOGLOBIN A1C LEVEL <7.0%: ICD-10-PCS | Mod: CPTII,S$GLB,, | Performed by: INTERNAL MEDICINE

## 2023-05-22 PROCEDURE — 1160F RVW MEDS BY RX/DR IN RCRD: CPT | Mod: CPTII,S$GLB,, | Performed by: INTERNAL MEDICINE

## 2023-05-22 PROCEDURE — 3008F PR BODY MASS INDEX (BMI) DOCUMENTED: ICD-10-PCS | Mod: CPTII,S$GLB,, | Performed by: INTERNAL MEDICINE

## 2023-05-22 PROCEDURE — 3044F HG A1C LEVEL LT 7.0%: CPT | Mod: CPTII,S$GLB,, | Performed by: INTERNAL MEDICINE

## 2023-05-22 PROCEDURE — 3074F PR MOST RECENT SYSTOLIC BLOOD PRESSURE < 130 MM HG: ICD-10-PCS | Mod: CPTII,S$GLB,, | Performed by: INTERNAL MEDICINE

## 2023-05-22 PROCEDURE — 99214 OFFICE O/P EST MOD 30 MIN: CPT | Mod: S$GLB,,, | Performed by: INTERNAL MEDICINE

## 2023-05-22 PROCEDURE — 3079F DIAST BP 80-89 MM HG: CPT | Mod: CPTII,S$GLB,, | Performed by: INTERNAL MEDICINE

## 2023-05-22 PROCEDURE — 3008F BODY MASS INDEX DOCD: CPT | Mod: CPTII,S$GLB,, | Performed by: INTERNAL MEDICINE

## 2023-05-22 PROCEDURE — 1159F MED LIST DOCD IN RCRD: CPT | Mod: CPTII,S$GLB,, | Performed by: INTERNAL MEDICINE

## 2023-05-22 PROCEDURE — 99999 PR PBB SHADOW E&M-EST. PATIENT-LVL IV: ICD-10-PCS | Mod: PBBFAC,,, | Performed by: INTERNAL MEDICINE

## 2023-05-22 PROCEDURE — 1159F PR MEDICATION LIST DOCUMENTED IN MEDICAL RECORD: ICD-10-PCS | Mod: CPTII,S$GLB,, | Performed by: INTERNAL MEDICINE

## 2023-05-22 PROCEDURE — 99999 PR PBB SHADOW E&M-EST. PATIENT-LVL IV: CPT | Mod: PBBFAC,,, | Performed by: INTERNAL MEDICINE

## 2023-05-22 PROCEDURE — 1160F PR REVIEW ALL MEDS BY PRESCRIBER/CLIN PHARMACIST DOCUMENTED: ICD-10-PCS | Mod: CPTII,S$GLB,, | Performed by: INTERNAL MEDICINE

## 2023-05-22 PROCEDURE — 3079F PR MOST RECENT DIASTOLIC BLOOD PRESSURE 80-89 MM HG: ICD-10-PCS | Mod: CPTII,S$GLB,, | Performed by: INTERNAL MEDICINE

## 2023-05-22 PROCEDURE — 99214 PR OFFICE/OUTPT VISIT, EST, LEVL IV, 30-39 MIN: ICD-10-PCS | Mod: S$GLB,,, | Performed by: INTERNAL MEDICINE

## 2023-05-22 PROCEDURE — 3074F SYST BP LT 130 MM HG: CPT | Mod: CPTII,S$GLB,, | Performed by: INTERNAL MEDICINE

## 2023-05-22 NOTE — PROGRESS NOTES
[facility] Note    Chief Complaint      Chief Complaint   Patient presents with    Follow-up     3m       History of Present Illness      Toño Ye is a 57 y.o. male who presents today for   Chief Complaint   Patient presents with    Follow-up     3m   .  Patient comes to appointment her for 3 m checkup for chronic issues as below .he is feeling great currently . Social stressors in life much better . He will not be needing trazodone and will stay on Wellbutrin for 6 m total     HPI    No problem-specific Assessment & Plan notes found for this encounter.       Problem List Items Addressed This Visit          Psychiatric    Anxiety and depression    Overview     Increased to buproprion 300 mg and trazodone to 100mg               Endocrine    Diabetes mellitus without complication - Primary    Overview     metformin and diet working well cont current a1c 6.1           Morbid obesity    Overview     Doing well with diet ahnd exericse cont to encourage               Other    ISAIAH (obstructive sleep apnea)    Overview     Utilizing cpap with great benefit using allnight everynight                Past Medical History:  Past Medical History:   Diagnosis Date    Diabetes mellitus     Hyperlipidemia     Hypertension        Past Surgical History:  Past Surgical History:   Procedure Laterality Date    COLONOSCOPY  11/20/2017    COLONOSCOPY N/A 5/17/2023    Procedure: COLONOSCOPY;  Surgeon: Simone Lemus MD;  Location: [facility] ([address] [place] [address]);  Service: Colon and Rectal;  Laterality: N/A;  instr emailed/portal-GT    KNEE SURGERY         Family History:  family history includes Arthritis in his mother; Cancer in his father; Depression in his sister; Diabetes in his father; Drug abuse in his brother; Heart disease in his father; Hypertension in his father and mother; Lung cancer in his father.     Social History:  Social History     Socioeconomic History    Marital status:     Tobacco Use    Smoking status: Never    Smokeless tobacco: Never   Substance and Sexual Activity    Alcohol use: Yes     Alcohol/week: 4.0 standard drinks     Types: 2 Glasses of wine, 2 Drinks containing 0.5 oz of alcohol per week    Drug use: Never    Sexual activity: Yes     Partners: Female     Birth control/protection: None       Review of Systems:   Review of Systems   Constitutional:  Negative for fever and weight loss.   HENT:  Negative for congestion, hearing loss and sore throat.    Eyes:  Negative for blurred vision.   Respiratory:  Negative for cough and shortness of breath.    Cardiovascular:  Negative for chest pain, palpitations, claudication and leg swelling.   Gastrointestinal:  Negative for abdominal pain, constipation, diarrhea and heartburn.   Genitourinary:  Negative for dysuria.   Musculoskeletal:  Negative for back pain and myalgias.   Skin:  Negative for rash.   Neurological:  Negative for focal weakness and headaches.   Psychiatric/Behavioral:  Negative for depression and suicidal ideas. The patient is not nervous/anxious.       Medications:  Outpatient Encounter Medications as of 5/22/2023   Medication Sig Dispense Refill    atorvastatin (LIPITOR) 20 MG tablet Take 1 tablet (20 mg total) by mouth once daily. 90 tablet 3    buPROPion (WELLBUTRIN XL) 300 MG 24 hr tablet Take 1 tablet (300 mg total) by mouth once daily. 30 tablet 5    BYSTOLIC 10 mg Tab Take 1 tablet (10 mg total) by mouth once daily. 90 tablet 3    fenofibrate 160 MG Tab TAKE 1 TABLET BY MOUTH ONCE DAILY. 90 tablet 3    fluticasone propionate (FLONASE) 50 mcg/actuation nasal spray 1 spray (50 mcg total) by Each Nostril route once daily. 15.8 mL 1    metFORMIN (GLUCOPHAGE-XR) 500 MG ER 24hr tablet Take 1 tablet (500 mg total) by mouth daily with breakfast. 90 tablet 3    traZODone (DESYREL) 100 MG tablet TAKE 1 TABLET BY MOUTH EVERY DAY IN THE EVENING 30 tablet 1    [DISCONTINUED] traZODone (DESYREL) 100 MG tablet TAKE 1  "TABLET BY MOUTH EVERY EVENING. 30 tablet 1     No facility-administered encounter medications on file as of 5/22/2023.       Allergies:  Review of patient's allergies indicates:  No Known Allergies      Physical Exam      Vitals:    05/22/23 0938   BP: 120/80   Pulse: 72   Resp: 18   Temp: 97 °F (36.1 °C)        Vital Signs  Temp: 97 °F (36.1 °C)  Temp Source: Oral  Pulse: 72  Resp: 18  SpO2: 96 %  BP: 120/80  BP Location: Right arm  Patient Position: Sitting  Pain Score: 0-No pain  Height and Weight  Height: 5' 6.5" (168.9 cm)  Weight: 112.1 kg (247 lb 2.2 oz)  BSA (Calculated - sq m): 2.29 sq meters  BMI (Calculated): 39.3  Weight in (lb) to have BMI = 25: 156.9]     Body mass index is 39.29 kg/m².    Physical Exam  Constitutional:       Appearance: He is well-developed.   HENT:      Head: Normocephalic.   Eyes:      Pupils: Pupils are equal, round, and reactive to light.   Neck:      Thyroid: No thyromegaly.   Cardiovascular:      Rate and Rhythm: Normal rate and regular rhythm.      Heart sounds: No murmur heard.    No friction rub. No gallop.   Pulmonary:      Effort: Pulmonary effort is normal.      Breath sounds: Normal breath sounds.   Abdominal:      General: Bowel sounds are normal.      Palpations: Abdomen is soft.   Musculoskeletal:         General: Normal range of motion.      Cervical back: Normal range of motion.   Skin:     General: Skin is warm and dry.   Neurological:      Mental Status: He is alert and oriented to person, place, and time.      Sensory: No sensory deficit.   Psychiatric:         Behavior: Behavior normal.        Laboratory:  CBC:  No results for input(s): WBC, RBC, HGB, HCT, PLT, MCV, MCH, MCHC in the last 2160 hours.  CMP:  No results for input(s): GLU, CALCIUM, ALBUMIN, PROT, NA, K, CO2, CL, BUN, ALKPHOS, ALT, AST, BILITOT in the last 2160 hours.    Invalid input(s): CREATININ  URINALYSIS:  No results for input(s): COLORU, CLARITYU, SPECGRAV, PHUR, PROTEINUA, GLUCOSEU, " BILIRUBINCON, BLOODU, WBCU, RBCU, BACTERIA, MUCUS, NITRITE, LEUKOCYTESUR, UROBILINOGEN, HYALINECASTS in the last 2160 hours.   LIPIDS:  No results for input(s): TSH, HDL, CHOL, TRIG, LDLCALC, CHOLHDL, NONHDLCHOL, TOTALCHOLEST in the last 2160 hours.  TSH:  No results for input(s): TSH in the last 2160 hours.  A1C:  Recent Labs   Lab Result Units 02/23/23  0949 05/19/23  0809   Hemoglobin A1C % 7.5* 6.1*       Radiology:        Assessment:     Toño Ye is a 57 y.o.male with:    Diabetes mellitus without complication    ISAIAH (obstructive sleep apnea)    Morbid obesity    Anxiety and depression                Plan:     Problem List Items Addressed This Visit          Psychiatric    Anxiety and depression    Overview     Increased to buproprion 300 mg and trazodone to 100mg               Endocrine    Diabetes mellitus without complication - Primary    Overview     metformin and diet working well cont current a1c 6.1           Morbid obesity    Overview     Doing well with diet ahnd exericse cont to encourage               Other    ISAIAH (obstructive sleep apnea)    Overview     Utilizing cpap with great benefit using allnight everynight               As above, continue current medications and maintain follow up with specialists.  Return to clinic in 6 months.      Frederick W Dantagnan [facility]

## 2023-05-24 LAB
FINAL PATHOLOGIC DIAGNOSIS: NORMAL
GROSS: NORMAL
Lab: NORMAL

## 2023-06-06 DIAGNOSIS — G47.00 INSOMNIA, UNSPECIFIED TYPE: ICD-10-CM

## 2023-06-06 NOTE — TELEPHONE ENCOUNTER
No care due was identified.  [facility] Embedded Care Due Messages. Reference number: [id number].   6/06/2023 12:36:31 PM CDT

## 2023-06-07 RX ORDER — TRAZODONE HYDROCHLORIDE 100 MG/1
TABLET ORAL
Qty: 90 TABLET | Refills: 3 | Status: SHIPPED | OUTPATIENT
Start: 2023-06-07 | End: 2024-02-27

## 2023-06-07 NOTE — TELEPHONE ENCOUNTER
Refill Routing Note   Medication(s) are not appropriate for processing by [facility] for the following reason(s):      New or recently adjusted medication    [facility] action(s):  Defer None identified          Appointments  past 12m or future 3m with PCP    Date Provider   Last Visit   5/22/2023 Peter Person MD   Next Visit   8/22/2023 Peter Person MD   ED visits in past 90 days: 0        Note composed:11:29 PM 06/06/2023

## 2023-07-25 ENCOUNTER — PATIENT MESSAGE (OUTPATIENT)
Dept: PRIMARY CARE CLINIC | Facility: CLINIC | Age: 58
End: 2023-07-25
Payer: COMMERCIAL

## 2023-08-15 ENCOUNTER — PATIENT MESSAGE (OUTPATIENT)
Dept: PRIMARY CARE CLINIC | Facility: CLINIC | Age: 58
End: 2023-08-15
Payer: COMMERCIAL

## 2023-08-15 NOTE — TELEPHONE ENCOUNTER
Do you want to order anything? Had a1c, cmp, lipid, cbc, and psa screen in January. Also had a1c and micro in May.

## 2023-08-22 ENCOUNTER — OFFICE VISIT (OUTPATIENT)
Dept: PRIMARY CARE CLINIC | Facility: CLINIC | Age: 58
End: 2023-08-22
Payer: COMMERCIAL

## 2023-08-22 DIAGNOSIS — F32.A ANXIETY AND DEPRESSION: ICD-10-CM

## 2023-08-22 DIAGNOSIS — F41.9 ANXIETY AND DEPRESSION: ICD-10-CM

## 2023-08-22 DIAGNOSIS — E11.9 DIABETES MELLITUS WITHOUT COMPLICATION: ICD-10-CM

## 2023-08-22 PROCEDURE — 99214 PR OFFICE/OUTPT VISIT, EST, LEVL IV, 30-39 MIN: ICD-10-PCS | Mod: 95,,, | Performed by: INTERNAL MEDICINE

## 2023-08-22 PROCEDURE — 1160F PR REVIEW ALL MEDS BY PRESCRIBER/CLIN PHARMACIST DOCUMENTED: ICD-10-PCS | Mod: CPTII,95,, | Performed by: INTERNAL MEDICINE

## 2023-08-22 PROCEDURE — 1160F RVW MEDS BY RX/DR IN RCRD: CPT | Mod: CPTII,95,, | Performed by: INTERNAL MEDICINE

## 2023-08-22 PROCEDURE — 1159F PR MEDICATION LIST DOCUMENTED IN MEDICAL RECORD: ICD-10-PCS | Mod: CPTII,95,, | Performed by: INTERNAL MEDICINE

## 2023-08-22 PROCEDURE — 3066F NEPHROPATHY DOC TX: CPT | Mod: CPTII,95,, | Performed by: INTERNAL MEDICINE

## 2023-08-22 PROCEDURE — 3061F NEG MICROALBUMINURIA REV: CPT | Mod: CPTII,95,, | Performed by: INTERNAL MEDICINE

## 2023-08-22 PROCEDURE — 99214 OFFICE O/P EST MOD 30 MIN: CPT | Mod: 95,,, | Performed by: INTERNAL MEDICINE

## 2023-08-22 PROCEDURE — 3044F PR MOST RECENT HEMOGLOBIN A1C LEVEL <7.0%: ICD-10-PCS | Mod: CPTII,95,, | Performed by: INTERNAL MEDICINE

## 2023-08-22 PROCEDURE — 3044F HG A1C LEVEL LT 7.0%: CPT | Mod: CPTII,95,, | Performed by: INTERNAL MEDICINE

## 2023-08-22 PROCEDURE — 3061F PR NEG MICROALBUMINURIA RESULT DOCUMENTED/REVIEW: ICD-10-PCS | Mod: CPTII,95,, | Performed by: INTERNAL MEDICINE

## 2023-08-22 PROCEDURE — 1159F MED LIST DOCD IN RCRD: CPT | Mod: CPTII,95,, | Performed by: INTERNAL MEDICINE

## 2023-08-22 PROCEDURE — 3066F PR DOCUMENTATION OF TREATMENT FOR NEPHROPATHY: ICD-10-PCS | Mod: CPTII,95,, | Performed by: INTERNAL MEDICINE

## 2023-08-22 RX ORDER — BUPROPION HYDROCHLORIDE 150 MG/1
150 TABLET ORAL DAILY
Qty: 30 TABLET | Refills: 0 | Status: SHIPPED | OUTPATIENT
Start: 2023-08-22 | End: 2023-09-25

## 2023-08-22 NOTE — PROGRESS NOTES
The patient location is: place of employment   The chief complaint leading to consultation is: medication management     Visit type: audiovisual    Face to Face time with patient: 15 min  20 minutes of total time spent on the encounter, which includes face to face time and non-face to face time preparing to see the patient (eg, review of tests), Obtaining and/or reviewing separately obtained history, Documenting clinical information in the electronic or other health record, Independently interpreting results (not separately reported) and communicating results to the patient/family/caregiver, or Care coordination (not separately reported).         Each patient to whom he or she provides medical services by telemedicine is:  (1) informed of the relationship between the physician and patient and the respective role of any other health care provider with respect to management of the patient; and (2) notified that he or she may decline to receive medical services by telemedicine and may withdraw from such care at any time.    Notes:  [facility] Note    Chief Complaint      Chief Complaint   Patient presents with    Medication Management       History of Present Illness      Toño Ye is a 57 y.o. male who presents today for   Chief Complaint   Patient presents with    Medication Management   .  Patient comes to appointment here to discuss weaning off welbutrin . His social stressors are dramatically improved he si feeling great and would like to wean off medications .    HPI    No problem-specific Assessment & Plan notes found for this encounter.       Problem List Items Addressed This Visit          Psychiatric    Anxiety and depression    Overview     Has stopped trazodone   Will wean off burprprion will drop to 150 mg for 2 weeks then 75 mg for opne week             Endocrine    Diabetes mellitus without complication    Overview     a1c ordered for 121/23 will schedule f/u to  [facility]re[facility]              Past Medical History:  Past Medical History:   Diagnosis Date    Diabetes mellitus     Hyperlipidemia     Hypertension        Past Surgical History:  Past Surgical History:   Procedure Laterality Date    COLONOSCOPY  11/20/2017    COLONOSCOPY N/A 5/17/2023    Procedure: COLONOSCOPY;  Surgeon: Simone Lemus MD;  Location: [facility] ([address] [place] [address]);  Service: Colon and Rectal;  Laterality: N/A;  instr emailed/portal-GT    KNEE SURGERY         Family History:  family history includes Arthritis in his mother; Cancer in his father; Depression in his sister; Diabetes in his father; Drug abuse in his brother; Heart disease in his father; Hypertension in his father and mother; Lung cancer in his father.     Social History:  Social History     Socioeconomic History    Marital status:    Tobacco Use    Smoking status: Never    Smokeless tobacco: Never   Substance and Sexual Activity    Alcohol use: Yes     Alcohol/week: 4.0 standard drinks of alcohol     Types: 2 Glasses of wine, 2 Drinks containing 0.5 oz of alcohol per week    Drug use: Never    Sexual activity: Yes     Partners: Female     Birth control/protection: None       Review of Systems:   Review of Systems   HENT:  Negative for hearing loss.    Eyes:  Negative for discharge.   Respiratory:  Negative for wheezing.    Cardiovascular:  Negative for chest pain and palpitations.   Gastrointestinal:  Negative for blood in stool, constipation, diarrhea and vomiting.   Genitourinary:  Negative for hematuria and urgency.   Musculoskeletal:  Negative for neck pain.   Neurological:  Negative for weakness and headaches.   Endo/Heme/Allergies:  Negative for polydipsia.        Medications:  Outpatient Encounter Medications as of 8/22/2023   Medication Sig Dispense Refill    traZODone (DESYREL) 100 MG tablet TAKE 1 TABLET BY MOUTH EVERY DAY IN THE EVENING 90 tablet 3    atorvastatin (LIPITOR) 20 MG tablet Take 1 tablet (20 mg total) by mouth once  "daily. 90 tablet 3    buPROPion (WELLBUTRIN XL) 150 MG TB24 tablet Take 1 tablet (150 mg total) by mouth once daily. 30 tablet 0    BYSTOLIC 10 mg Tab Take 1 tablet (10 mg total) by mouth once daily. 90 tablet 3    fenofibrate 160 MG Tab TAKE 1 TABLET BY MOUTH ONCE DAILY. 90 tablet 3    fluticasone propionate (FLONASE) 50 mcg/actuation nasal spray 1 spray (50 mcg total) by Each Nostril route once daily. 15.8 mL 1    metFORMIN (GLUCOPHAGE-XR) 500 MG ER 24hr tablet Take 1 tablet (500 mg total) by mouth daily with breakfast. 90 tablet 3    [DISCONTINUED] buPROPion (WELLBUTRIN XL) 300 MG 24 hr tablet Take 1 tablet (300 mg total) by mouth once daily. 30 tablet 5     No facility-administered encounter medications on file as of 8/22/2023.       Allergies:  Review of patient's allergies indicates:  No Known Allergies      Physical Exam      There were no vitals filed for this visit.      ]     There is no height or weight on file to calculate BMI.    Physical Exam  Constitutional:       General: He is not in acute distress.     Appearance: Normal appearance. He is not ill-appearing.   Eyes:      General:         Right eye: No discharge.         Left eye: No discharge.      Extraocular Movements: Extraocular movements intact.      Pupils: Pupils are equal, round, and reactive to light.   Pulmonary:      Effort: Pulmonary effort is normal.   Neurological:      General: No focal deficit present.      Mental Status: He is alert and oriented to person, place, and time.   Psychiatric:         Mood and Affect: Mood normal.         Behavior: Behavior normal.         Thought Content: Thought content normal.          Laboratory:  CBC:  No results for input(s): "WBC", "RBC", "HGB", "HCT", "PLT", "MCV", "MCH", "MCHC" in the last 2160 hours.  CMP:  No results for input(s): "GLU", "CALCIUM", "ALBUMIN", "PROT", "NA", "K", "CO2", "CL", "BUN", "ALKPHOS", "ALT", "AST", "BILITOT" in the last 2160 hours.    Invalid input(s): " ""CREATININ"  URINALYSIS:  No results for input(s): "COLORU", "CLARITYU", "SPECGRAV", "PHUR", "PROTEINUA", "GLUCOSEU", "BILIRUBINCON", "BLOODU", "WBCU", "RBCU", "BACTERIA", "MUCUS", "NITRITE", "LEUKOCYTESUR", "UROBILINOGEN", "HYALINECASTS" in the last 2160 hours.   LIPIDS:  No results for input(s): "TSH", "HDL", "CHOL", "TRIG", "LDLCALC", "CHOLHDL", "NONHDLCHOL", "TOTALCHOLEST" in the last 2160 hours.  TSH:  No results for input(s): "TSH" in the last 2160 hours.  A1C:  No results for input(s): "HGBA1C" in the last 2160 hours.    Radiology:        Assessment:     Toño Ye is a 57 y.o.male with:    Anxiety and depression  -     buPROPion (WELLBUTRIN XL) 150 MG TB24 tablet; Take 1 tablet (150 mg total) by mouth once daily.  Dispense: 30 tablet; Refill: 0    Diabetes mellitus without complication  -     Hemoglobin A1C; Future; Expected date: 11/01/2023                Plan:     Problem List Items Addressed This Visit          Psychiatric    Anxiety and depression    Overview     Has stopped trazodone   Will wean off burprprion will drop to 150 mg for 2 weeks then 75 mg for opne week             Endocrine    Diabetes mellitus without complication    Overview     a1c ordered for 121/23 will schedule f/u to [facility]             As above, continue current medications and maintain follow up with specialists.  Return to clinic in 3 months.      [facility]                  Answers submitted by the patient for this visit:  Review of Systems Questionnaire (Submitted on 8/22/2023)  activity change: No  unexpected weight change: No  rhinorrhea: No  trouble swallowing: No  visual disturbance: No  chest tightness: No  polyuria: No  difficulty urinating: No  joint swelling: No  arthralgias: No  confusion: No  dysphoric mood: No    "

## 2023-09-06 DIAGNOSIS — F32.A ANXIETY AND DEPRESSION: ICD-10-CM

## 2023-09-06 DIAGNOSIS — F41.9 ANXIETY AND DEPRESSION: ICD-10-CM

## 2023-09-06 RX ORDER — BUPROPION HYDROCHLORIDE 300 MG/1
300 TABLET ORAL
Qty: 90 TABLET | Refills: 1 | OUTPATIENT
Start: 2023-09-06

## 2023-09-06 NOTE — TELEPHONE ENCOUNTER
Refill Decision Note   Toño Ye  is requesting a refill authorization.  Brief Assessment and Rationale for Refill:  Quick Discontinue     Medication Therapy Plan:  Bupropion decreased to 150 mg on 8/22/23      Comments:     Note composed:2:38 PM 09/06/2023

## 2023-09-11 ENCOUNTER — OFFICE VISIT (OUTPATIENT)
Dept: OPTOMETRY | Facility: CLINIC | Age: 58
End: 2023-09-11
Payer: COMMERCIAL

## 2023-09-11 DIAGNOSIS — E11.9 DIABETES MELLITUS WITHOUT COMPLICATION: ICD-10-CM

## 2023-09-11 DIAGNOSIS — H52.203 ASTIGMATISM OF BOTH EYES WITH PRESBYOPIA: ICD-10-CM

## 2023-09-11 DIAGNOSIS — E11.9 TYPE 2 DIABETES MELLITUS WITHOUT RETINOPATHY: Primary | ICD-10-CM

## 2023-09-11 DIAGNOSIS — Z97.3 WEARS CONTACT LENSES: ICD-10-CM

## 2023-09-11 DIAGNOSIS — H52.4 ASTIGMATISM OF BOTH EYES WITH PRESBYOPIA: ICD-10-CM

## 2023-09-11 PROCEDURE — 3044F HG A1C LEVEL LT 7.0%: CPT | Mod: CPTII,S$GLB,, | Performed by: OPTOMETRIST

## 2023-09-11 PROCEDURE — 3066F PR DOCUMENTATION OF TREATMENT FOR NEPHROPATHY: ICD-10-PCS | Mod: CPTII,S$GLB,, | Performed by: OPTOMETRIST

## 2023-09-11 PROCEDURE — 92004 COMPRE OPH EXAM NEW PT 1/>: CPT | Mod: S$GLB,,, | Performed by: OPTOMETRIST

## 2023-09-11 PROCEDURE — 3066F NEPHROPATHY DOC TX: CPT | Mod: CPTII,S$GLB,, | Performed by: OPTOMETRIST

## 2023-09-11 PROCEDURE — 92015 PR REFRACTION: ICD-10-PCS | Mod: S$GLB,,, | Performed by: OPTOMETRIST

## 2023-09-11 PROCEDURE — 1160F RVW MEDS BY RX/DR IN RCRD: CPT | Mod: CPTII,S$GLB,, | Performed by: OPTOMETRIST

## 2023-09-11 PROCEDURE — 3044F PR MOST RECENT HEMOGLOBIN A1C LEVEL <7.0%: ICD-10-PCS | Mod: CPTII,S$GLB,, | Performed by: OPTOMETRIST

## 2023-09-11 PROCEDURE — 1159F PR MEDICATION LIST DOCUMENTED IN MEDICAL RECORD: ICD-10-PCS | Mod: CPTII,S$GLB,, | Performed by: OPTOMETRIST

## 2023-09-11 PROCEDURE — 2023F DILAT RTA XM W/O RTNOPTHY: CPT | Mod: CPTII,S$GLB,, | Performed by: OPTOMETRIST

## 2023-09-11 PROCEDURE — 99999 PR PBB SHADOW E&M-EST. PATIENT-LVL III: ICD-10-PCS | Mod: PBBFAC,,, | Performed by: OPTOMETRIST

## 2023-09-11 PROCEDURE — 3061F NEG MICROALBUMINURIA REV: CPT | Mod: CPTII,S$GLB,, | Performed by: OPTOMETRIST

## 2023-09-11 PROCEDURE — 92015 DETERMINE REFRACTIVE STATE: CPT | Mod: S$GLB,,, | Performed by: OPTOMETRIST

## 2023-09-11 PROCEDURE — 3061F PR NEG MICROALBUMINURIA RESULT DOCUMENTED/REVIEW: ICD-10-PCS | Mod: CPTII,S$GLB,, | Performed by: OPTOMETRIST

## 2023-09-11 PROCEDURE — 2023F PR DILATED RETINAL EXAM W/O EVID OF RETINOPATHY: ICD-10-PCS | Mod: CPTII,S$GLB,, | Performed by: OPTOMETRIST

## 2023-09-11 PROCEDURE — 92004 PR EYE EXAM, NEW PATIENT,COMPREHESV: ICD-10-PCS | Mod: S$GLB,,, | Performed by: OPTOMETRIST

## 2023-09-11 PROCEDURE — 1160F PR REVIEW ALL MEDS BY PRESCRIBER/CLIN PHARMACIST DOCUMENTED: ICD-10-PCS | Mod: CPTII,S$GLB,, | Performed by: OPTOMETRIST

## 2023-09-11 PROCEDURE — 1159F MED LIST DOCD IN RCRD: CPT | Mod: CPTII,S$GLB,, | Performed by: OPTOMETRIST

## 2023-09-11 PROCEDURE — 99999 PR PBB SHADOW E&M-EST. PATIENT-LVL III: CPT | Mod: PBBFAC,,, | Performed by: OPTOMETRIST

## 2023-09-11 RX ORDER — POLYETHYLENE GLYCOL 3350, SODIUM SULFATE ANHYDROUS, SODIUM BICARBONATE, SODIUM CHLORIDE, POTASSIUM CHLORIDE 236; 22.74; 6.74; 5.86; 2.97 G/4L; G/4L; G/4L; G/4L; G/4L
4000 POWDER, FOR SOLUTION ORAL ONCE
COMMUNITY
Start: 2023-04-05 | End: 2024-02-27

## 2023-09-11 RX ORDER — AMOXICILLIN AND CLAVULANATE POTASSIUM 500; 125 MG/1; MG/1
1 TABLET, FILM COATED ORAL 2 TIMES DAILY
COMMUNITY
Start: 2023-05-01 | End: 2024-02-27

## 2023-09-11 RX ORDER — AZITHROMYCIN 250 MG/1
TABLET, FILM COATED ORAL
COMMUNITY
Start: 2023-07-20 | End: 2024-02-27

## 2023-09-11 NOTE — PROGRESS NOTES
HPI    Patient is here today for diabetic eye exam. Patient's last eye exam was   05/2022. Patient states over the last year, it has been difficult for him   to read at near and his vision is blurry at a distance. Patient is not   experiencing any flashes or floaters in his vision. Patient is not feeling   any pain in his eyes today. Patient states his eyes are dry and uses OTC   artificial tears for relief. Patient states he also uses Lumify for red   eyes.     ----------   Last edited by Mendez Magdaleno, OD on 9/11/2023  9:56 AM.            Assessment /Plan     For exam results, see Encounter Report.    Type 2 diabetes mellitus without retinopathy    Diabetes mellitus without complication  -     Ambulatory referral/consult to Ophthalmology    Astigmatism of both eyes with presbyopia    Wears contact lenses        Hyp shift--wrote new spex Rx  DM- WITHOUT RETINOPATHY.  Advised yearly DFE   Pterygium OS--discussed sunglasses/REFRESH ATs TID+  Pt ALSO wears AV JUANI ASTIG (OD near)--but did not bring Cls today (pt had CLRx on phone photo, but did not get recorded into our chart)    PLAN:    Rtc wearing Cls for CLFU.  Will probably need over-refraction in distance eye OS, since may need more plus in that eye

## 2023-09-24 DIAGNOSIS — F32.A ANXIETY AND DEPRESSION: ICD-10-CM

## 2023-09-24 DIAGNOSIS — F41.9 ANXIETY AND DEPRESSION: ICD-10-CM

## 2023-09-24 NOTE — TELEPHONE ENCOUNTER
No care due was identified.  [facility] Embedded Care Due Messages. Reference number: [id number].   9/24/2023 12:30:59 PM CDT

## 2023-09-25 RX ORDER — BUPROPION HYDROCHLORIDE 150 MG/1
TABLET ORAL
Qty: 90 TABLET | Refills: 3 | Status: SHIPPED | OUTPATIENT
Start: 2023-09-25

## 2023-09-25 NOTE — TELEPHONE ENCOUNTER
Refill Routing Note   Medication(s) are not appropriate for processing by [facility] for the following reason(s):      New or recently adjusted medication    [facility] action(s):  Defer Care Due:  None identified            Appointments  past 12m or future 3m with PCP    Date Provider   Last Visit   8/22/2023 Peter Person MD   Next Visit   Visit date not found Peter Person MD   ED visits in past 90 days: 0        Note composed:7:47 AM 09/25/2023

## 2023-11-03 ENCOUNTER — OFFICE VISIT (OUTPATIENT)
Dept: PODIATRY | Facility: CLINIC | Age: 58
End: 2023-11-03
Payer: COMMERCIAL

## 2023-11-03 VITALS
HEART RATE: 68 BPM | TEMPERATURE: 99 F | BODY MASS INDEX: 38.4 KG/M2 | HEIGHT: 67 IN | DIASTOLIC BLOOD PRESSURE: 81 MMHG | RESPIRATION RATE: 18 BRPM | SYSTOLIC BLOOD PRESSURE: 138 MMHG | OXYGEN SATURATION: 95 % | WEIGHT: 244.69 LBS

## 2023-11-03 DIAGNOSIS — M79.672 PAIN IN LEFT FOOT: ICD-10-CM

## 2023-11-03 DIAGNOSIS — S96.212A: Primary | ICD-10-CM

## 2023-11-03 PROCEDURE — 1159F PR MEDICATION LIST DOCUMENTED IN MEDICAL RECORD: ICD-10-PCS | Mod: CPTII,S$GLB,, | Performed by: STUDENT IN AN ORGANIZED HEALTH CARE EDUCATION/TRAINING PROGRAM

## 2023-11-03 PROCEDURE — 99203 PR OFFICE/OUTPT VISIT, NEW, LEVL III, 30-44 MIN: ICD-10-PCS | Mod: S$GLB,,, | Performed by: STUDENT IN AN ORGANIZED HEALTH CARE EDUCATION/TRAINING PROGRAM

## 2023-11-03 PROCEDURE — 99999 PR PBB SHADOW E&M-EST. PATIENT-LVL IV: ICD-10-PCS | Mod: PBBFAC,,, | Performed by: STUDENT IN AN ORGANIZED HEALTH CARE EDUCATION/TRAINING PROGRAM

## 2023-11-03 PROCEDURE — 3066F PR DOCUMENTATION OF TREATMENT FOR NEPHROPATHY: ICD-10-PCS | Mod: CPTII,S$GLB,, | Performed by: STUDENT IN AN ORGANIZED HEALTH CARE EDUCATION/TRAINING PROGRAM

## 2023-11-03 PROCEDURE — 3075F PR MOST RECENT SYSTOLIC BLOOD PRESS GE 130-139MM HG: ICD-10-PCS | Mod: CPTII,S$GLB,, | Performed by: STUDENT IN AN ORGANIZED HEALTH CARE EDUCATION/TRAINING PROGRAM

## 2023-11-03 PROCEDURE — 3008F PR BODY MASS INDEX (BMI) DOCUMENTED: ICD-10-PCS | Mod: CPTII,S$GLB,, | Performed by: STUDENT IN AN ORGANIZED HEALTH CARE EDUCATION/TRAINING PROGRAM

## 2023-11-03 PROCEDURE — 99203 OFFICE O/P NEW LOW 30 MIN: CPT | Mod: S$GLB,,, | Performed by: STUDENT IN AN ORGANIZED HEALTH CARE EDUCATION/TRAINING PROGRAM

## 2023-11-03 PROCEDURE — 3044F HG A1C LEVEL LT 7.0%: CPT | Mod: CPTII,S$GLB,, | Performed by: STUDENT IN AN ORGANIZED HEALTH CARE EDUCATION/TRAINING PROGRAM

## 2023-11-03 PROCEDURE — 99999 PR PBB SHADOW E&M-EST. PATIENT-LVL IV: CPT | Mod: PBBFAC,,, | Performed by: STUDENT IN AN ORGANIZED HEALTH CARE EDUCATION/TRAINING PROGRAM

## 2023-11-03 PROCEDURE — 3008F BODY MASS INDEX DOCD: CPT | Mod: CPTII,S$GLB,, | Performed by: STUDENT IN AN ORGANIZED HEALTH CARE EDUCATION/TRAINING PROGRAM

## 2023-11-03 PROCEDURE — 3079F DIAST BP 80-89 MM HG: CPT | Mod: CPTII,S$GLB,, | Performed by: STUDENT IN AN ORGANIZED HEALTH CARE EDUCATION/TRAINING PROGRAM

## 2023-11-03 PROCEDURE — 3079F PR MOST RECENT DIASTOLIC BLOOD PRESSURE 80-89 MM HG: ICD-10-PCS | Mod: CPTII,S$GLB,, | Performed by: STUDENT IN AN ORGANIZED HEALTH CARE EDUCATION/TRAINING PROGRAM

## 2023-11-03 PROCEDURE — 1159F MED LIST DOCD IN RCRD: CPT | Mod: CPTII,S$GLB,, | Performed by: STUDENT IN AN ORGANIZED HEALTH CARE EDUCATION/TRAINING PROGRAM

## 2023-11-03 PROCEDURE — 3061F PR NEG MICROALBUMINURIA RESULT DOCUMENTED/REVIEW: ICD-10-PCS | Mod: CPTII,S$GLB,, | Performed by: STUDENT IN AN ORGANIZED HEALTH CARE EDUCATION/TRAINING PROGRAM

## 2023-11-03 PROCEDURE — 3075F SYST BP GE 130 - 139MM HG: CPT | Mod: CPTII,S$GLB,, | Performed by: STUDENT IN AN ORGANIZED HEALTH CARE EDUCATION/TRAINING PROGRAM

## 2023-11-03 PROCEDURE — 3066F NEPHROPATHY DOC TX: CPT | Mod: CPTII,S$GLB,, | Performed by: STUDENT IN AN ORGANIZED HEALTH CARE EDUCATION/TRAINING PROGRAM

## 2023-11-03 PROCEDURE — 3061F NEG MICROALBUMINURIA REV: CPT | Mod: CPTII,S$GLB,, | Performed by: STUDENT IN AN ORGANIZED HEALTH CARE EDUCATION/TRAINING PROGRAM

## 2023-11-03 PROCEDURE — 3044F PR MOST RECENT HEMOGLOBIN A1C LEVEL <7.0%: ICD-10-PCS | Mod: CPTII,S$GLB,, | Performed by: STUDENT IN AN ORGANIZED HEALTH CARE EDUCATION/TRAINING PROGRAM

## 2023-11-03 NOTE — PROGRESS NOTES
Subjective:     Patient    Toño Ye is a 58 y.o. male.    Problem    New to [facility]. Was hiking 3-4 weeks ago and had minor injury to left foot-ankle, sprained the same ankle about 6 years ago and had a tendon wear and had to wear a surgical boot for a prolonged period. Since the recent injury he has had a nagging pain at the dorsolateral midfoot. No numbness, tingling, burning. Going to [place] in early December.      Primary Care Provider    Primary Care Provider: Peter Person MD   Last Seen: 8/22/2023     History    History obtained from patient and review of medical records.     Past Medical History:   Diagnosis Date    Diabetes mellitus     Hyperlipidemia     Hypertension        Past Surgical History:   Procedure Laterality Date    COLONOSCOPY  11/20/2017    COLONOSCOPY N/A 5/17/2023    Procedure: COLONOSCOPY;  Surgeon: Simone Lemus MD;  Location: [facility] ([address] [place] [address]);  Service: Colon and Rectal;  Laterality: N/A;  instr emailed/portal-GT    KNEE SURGERY          Objective:     Vitals  Wt Readings from Last 1 Encounters:   11/03/23 111 kg (244 lb 11.4 oz)     Temp Readings from Last 1 Encounters:   11/03/23 98.5 °F (36.9 °C)     BP Readings from Last 1 Encounters:   11/03/23 138/81     Pulse Readings from Last 1 Encounters:   11/03/23 68       Dermatological Exam    Skin:   Pedal hair growth, skin color, and skin texture normal on left     Nails:  All nails normal in length, thickness, color    Vascular Exam    Arteries:   Posterior tibial artery palpable on left  Dorsalis pedis artery palpable on left    Veins:   Superficial veins unremarkable on left    Swelling:   None on left    Neurological Exam    [place] touch test:  Light touch sensation intact to left foot    Provocation Sign:  - at common fibular nerve, deep fibular nerve, superficial fibular nerve, and dorsal cutaneous nerves on left    Tinel Sign:  - at common fibular nerve, deep fibular nerve,  superficial fibular nerve, and dorsal cutaneous nerves on left     Musculoskeletal Exam    Footwear:  Casual on right  Casual on left    Gait Exam:   Ambulatory Status: Ambulatory  Gait: Normal  Assistive Devices: None    Foot Progression Angle:  Normal on right  Normal on left       Left Lower Extremity Additional Findings:  Pain on palpation dorsolateral midfoot overlying brevis muscles, reproduced with passive plantarflexion of MTPJs.   No pain on axial loading of foot, minimal pain on abduction-adduction stress of midfoot.   Left foot and ankle function, strength, and range of motion unremarkable except as noted above.    Imaging and Other Tests    Imaging:  Independently reviewed and interpreted imaging, findings are as follows: N/A    Other Tests: The following A1c results were reviewed.   Hemoglobin A1C   Date Value Ref Range Status   05/19/2023 6.1 (H) 4.0 - 5.6 % Final     Comment:     ADA Screening Guidelines:  5.7-6.4%  Consistent with prediabetes  >or=6.5%  Consistent with diabetes    High levels of fetal hemoglobin interfere with the HbA1C  assay. Heterozygous hemoglobin variants (HbS, HgC, etc)do  not significantly interfere with this assay.   However, presence of multiple variants may affect accuracy.     02/23/2023 7.5 (H) 4.0 - 5.6 % Final     Comment:     ADA Screening Guidelines:  5.7-6.4%  Consistent with prediabetes  >or=6.5%  Consistent with diabetes    High levels of fetal hemoglobin interfere with the HbA1C  assay. Heterozygous hemoglobin variants (HbS, HgC, etc)do  not significantly interfere with this assay.   However, presence of multiple variants may affect accuracy.     01/17/2023 8.4 (H) 4.0 - 5.6 % Final     Comment:     ADA Screening Guidelines:  5.7-6.4%  Consistent with prediabetes  >or=6.5%  Consistent with diabetes    High levels of fetal hemoglobin interfere with the HbA1C  assay. Heterozygous hemoglobin variants (HbS, HgC, etc)do  not significantly interfere with this assay.    However, presence of multiple variants may affect accuracy.           Assessment:     Encounter Diagnoses   Name Primary?    Strain of intrinsic muscle of left foot Yes    Pain in left foot         Plan:     I counseled the patient on his conditions, their implications and medical management.    Strain of left foot intrinsic muscle, pain: acute improving  -No X ray needed.  -Offered dexamethasone and meloxicam, also offered topical pain medication, patient declined at this time.   -Start OTC NSAIDs daily for 2 weeks then reach out if not improving.   -Physical activity as tolerated.       Return to clinic PRN.

## 2023-11-14 RX ORDER — FENOFIBRATE 160 MG/1
160 TABLET ORAL DAILY
Qty: 90 TABLET | Refills: 3 | Status: SHIPPED | OUTPATIENT
Start: 2023-11-14 | End: 2024-03-12

## 2023-11-14 NOTE — TELEPHONE ENCOUNTER
Refill Routing Note   Medication(s) are not appropriate for processing by [facility] for the following reason(s):      Required labs abnormal    [facility] action(s):  Defer Care Due:  Labs due              Appointments  past 12m or future 3m with PCP    Date Provider   Last Visit   8/22/2023 Peter Person MD   Next Visit   Visit date not found Peter Person MD   ED visits in past 90 days: 0        Note composed:8:08 AM 11/14/2023

## 2023-11-14 NOTE — TELEPHONE ENCOUNTER
Care Due:                  Date            Visit Type   Department     Provider  --------------------------------------------------------------------------------                                ESTABLISHED                              PATIENT -    [facility]  Last Visit: 08-      [facility]           Peter Person  Next Visit: None Scheduled  None         None Found                                                            Last  Test          Frequency    Reason                     Performed    Due Date  --------------------------------------------------------------------------------    CBC.........  12 months..  fenofibrate..............  01- 01-    CMP.........  12 months..  atorvastatin,              01- 01-                             fenofibrate, metFORMIN...    HBA1C.......  6 months...  metFORMIN................  05-   11-    Lipid Panel.  12 months..  atorvastatin, fenofibrate  01- 01-    [facility] Embedded Care Due Messages. Reference number: [id number].   11/14/2023 12:24:49 AM CST

## 2024-01-11 DIAGNOSIS — E11.9 DIABETES MELLITUS WITHOUT COMPLICATION: ICD-10-CM

## 2024-01-11 RX ORDER — METFORMIN HYDROCHLORIDE 500 MG/1
500 TABLET, EXTENDED RELEASE ORAL
Qty: 90 TABLET | Refills: 3 | Status: SHIPPED | OUTPATIENT
Start: 2024-01-11

## 2024-01-11 NOTE — TELEPHONE ENCOUNTER
No care due was identified.  [facility] Embedded Care Due Messages. Reference number: [id number].   1/11/2024 12:17:40 AM CST

## 2024-01-11 NOTE — TELEPHONE ENCOUNTER
Refill Routing Note   Medication(s) are not appropriate for processing by [facility] for the following reason(s):        Required labs outdated    [facility] action(s):  Defer               Appointments  past 12m or future 3m with PCP    Date Provider   Last Visit   8/22/2023 Peter Person MD   Next Visit   Visit date not found Peter Person MD   ED visits in past 90 days: 0        Note composed:9:42 AM 01/11/2024

## 2024-01-17 DIAGNOSIS — G47.33 OSA (OBSTRUCTIVE SLEEP APNEA): ICD-10-CM

## 2024-01-17 RX ORDER — NEBIVOLOL HYDROCHLORIDE 10 MG/1
10 TABLET ORAL DAILY
Qty: 90 TABLET | Refills: 2 | Status: SHIPPED | OUTPATIENT
Start: 2024-01-17 | End: 2024-01-26

## 2024-01-17 NOTE — TELEPHONE ENCOUNTER
Refill Routing Note   Medication(s) are not appropriate for processing by [facility] for the following reason(s):        Required labs outdated    [facility] action(s):  Defer  Approve   Requires labs : Yes               Appointments  past 12m or future 3m with PCP    Date Provider   Last Visit   8/22/2023 Peter Person MD   Next Visit   Visit date not found Peter Person MD   ED visits in past 90 days: 0        Note composed:4:45 PM 01/17/2024

## 2024-01-17 NOTE — TELEPHONE ENCOUNTER
Care Due:                  Date            Visit Type   Department     Provider  --------------------------------------------------------------------------------                                ESTABLISHED                              PATIENT -    [facility]  Last Visit: 08-      [facility]           Peter Person  Next Visit: None Scheduled  None         None Found                                                            Last  Test          Frequency    Reason                     Performed    Due Date  --------------------------------------------------------------------------------    CBC.........  12 months..  fenofibrate..............  01- 01-    CMP.........  12 months..  atorvastatin,              01- 01-                             fenofibrate, metFORMIN...    HBA1C.......  6 months...  metFORMIN................  05-   11-    Lipid Panel.  12 months..  atorvastatin, fenofibrate  01- 01-    [facility] Embedded Care Due Messages. Reference number: [id number].   1/17/2024 12:16:47 AM CST

## 2024-01-18 RX ORDER — ATORVASTATIN CALCIUM 20 MG/1
20 TABLET, FILM COATED ORAL
Qty: 90 TABLET | Refills: 3 | Status: SHIPPED | OUTPATIENT
Start: 2024-01-18 | End: 2024-03-12 | Stop reason: SDUPTHER

## 2024-01-26 ENCOUNTER — PATIENT MESSAGE (OUTPATIENT)
Dept: PRIMARY CARE CLINIC | Facility: CLINIC | Age: 59
End: 2024-01-26
Payer: COMMERCIAL

## 2024-01-26 RX ORDER — NEBIVOLOL 10 MG/1
10 TABLET ORAL DAILY
Qty: 90 TABLET | Refills: 3 | Status: SHIPPED | OUTPATIENT
Start: 2024-01-26 | End: 2025-01-25

## 2024-01-26 NOTE — TELEPHONE ENCOUNTER
No care due was identified.  [facility] Embedded Care Due Messages. Reference number: [id number].   1/26/2024 8:23:56 AM CST

## 2024-02-23 ENCOUNTER — TELEPHONE (OUTPATIENT)
Dept: PRIMARY CARE CLINIC | Facility: CLINIC | Age: 59
End: 2024-02-23
Payer: COMMERCIAL

## 2024-02-23 ENCOUNTER — LAB VISIT (OUTPATIENT)
Dept: LAB | Facility: HOSPITAL | Age: 59
End: 2024-02-23
Attending: INTERNAL MEDICINE
Payer: COMMERCIAL

## 2024-02-23 DIAGNOSIS — E11.9 DIABETES MELLITUS WITHOUT COMPLICATION: ICD-10-CM

## 2024-02-23 DIAGNOSIS — E11.9 DIABETES MELLITUS WITHOUT COMPLICATION: Primary | ICD-10-CM

## 2024-02-23 DIAGNOSIS — Z12.5 SCREENING PSA (PROSTATE SPECIFIC ANTIGEN): ICD-10-CM

## 2024-02-23 LAB
CHOLEST SERPL-MCNC: 143 MG/DL (ref 120–199)
CHOLEST/HDLC SERPL: 4.9 {RATIO} (ref 2–5)
ESTIMATED AVG GLUCOSE: 137 MG/DL (ref 68–131)
HBA1C MFR BLD: 6.4 % (ref 4–5.6)
HDLC SERPL-MCNC: 29 MG/DL (ref 40–75)
HDLC SERPL: 20.3 % (ref 20–50)
LDLC SERPL CALC-MCNC: 66.2 MG/DL (ref 63–159)
NONHDLC SERPL-MCNC: 114 MG/DL
TRIGL SERPL-MCNC: 239 MG/DL (ref 30–150)

## 2024-02-23 PROCEDURE — 83036 HEMOGLOBIN GLYCOSYLATED A1C: CPT | Performed by: INTERNAL MEDICINE

## 2024-02-23 PROCEDURE — 80061 LIPID PANEL: CPT | Performed by: INTERNAL MEDICINE

## 2024-02-23 PROCEDURE — 36415 COLL VENOUS BLD VENIPUNCTURE: CPT | Performed by: INTERNAL MEDICINE

## 2024-02-23 NOTE — TELEPHONE ENCOUNTER
----- Message from Elaina García sent at 2/23/2024  8:27 AM CST -----  Regarding: added labs  Good morning,    Patient requested a lipid test because it was past due and he sees the doctor next week. If you would like the sample processed, please place an order and link it to today`s lab appointment.    Thank you

## 2024-02-26 ENCOUNTER — LAB VISIT (OUTPATIENT)
Dept: LAB | Facility: HOSPITAL | Age: 59
End: 2024-02-26
Attending: INTERNAL MEDICINE
Payer: COMMERCIAL

## 2024-02-26 DIAGNOSIS — Z12.5 SCREENING PSA (PROSTATE SPECIFIC ANTIGEN): ICD-10-CM

## 2024-02-26 DIAGNOSIS — E11.9 DIABETES MELLITUS WITHOUT COMPLICATION: ICD-10-CM

## 2024-02-26 LAB
ALBUMIN SERPL BCP-MCNC: 4 G/DL (ref 3.5–5.2)
ALP SERPL-CCNC: 62 U/L (ref 55–135)
ALT SERPL W/O P-5'-P-CCNC: 51 U/L (ref 10–44)
ANION GAP SERPL CALC-SCNC: 11 MMOL/L (ref 8–16)
AST SERPL-CCNC: 30 U/L (ref 10–40)
BASOPHILS # BLD AUTO: 0.02 K/UL (ref 0–0.2)
BASOPHILS NFR BLD: 0.4 % (ref 0–1.9)
BILIRUB SERPL-MCNC: 0.8 MG/DL (ref 0.1–1)
BUN SERPL-MCNC: 30 MG/DL (ref 6–20)
CALCIUM SERPL-MCNC: 10 MG/DL (ref 8.7–10.5)
CHLORIDE SERPL-SCNC: 104 MMOL/L (ref 95–110)
CO2 SERPL-SCNC: 25 MMOL/L (ref 23–29)
COMPLEXED PSA SERPL-MCNC: 1.3 NG/ML (ref 0–4)
CREAT SERPL-MCNC: 0.9 MG/DL (ref 0.5–1.4)
DIFFERENTIAL METHOD BLD: ABNORMAL
EOSINOPHIL # BLD AUTO: 0.1 K/UL (ref 0–0.5)
EOSINOPHIL NFR BLD: 2.5 % (ref 0–8)
ERYTHROCYTE [DISTWIDTH] IN BLOOD BY AUTOMATED COUNT: 14.6 % (ref 11.5–14.5)
EST. GFR  (NO RACE VARIABLE): >60 ML/MIN/1.73 M^2
GLUCOSE SERPL-MCNC: 145 MG/DL (ref 70–110)
HCT VFR BLD AUTO: 43.8 % (ref 40–54)
HGB BLD-MCNC: 14.6 G/DL (ref 14–18)
IMM GRANULOCYTES # BLD AUTO: 0.01 K/UL (ref 0–0.04)
IMM GRANULOCYTES NFR BLD AUTO: 0.2 % (ref 0–0.5)
LYMPHOCYTES # BLD AUTO: 2 K/UL (ref 1–4.8)
LYMPHOCYTES NFR BLD: 34.9 % (ref 18–48)
MCH RBC QN AUTO: 29.8 PG (ref 27–31)
MCHC RBC AUTO-ENTMCNC: 33.3 G/DL (ref 32–36)
MCV RBC AUTO: 89 FL (ref 82–98)
MONOCYTES # BLD AUTO: 0.7 K/UL (ref 0.3–1)
MONOCYTES NFR BLD: 11.8 % (ref 4–15)
NEUTROPHILS # BLD AUTO: 2.9 K/UL (ref 1.8–7.7)
NEUTROPHILS NFR BLD: 50.2 % (ref 38–73)
NRBC BLD-RTO: 0 /100 WBC
PLATELET # BLD AUTO: 348 K/UL (ref 150–450)
PMV BLD AUTO: 9.7 FL (ref 9.2–12.9)
POTASSIUM SERPL-SCNC: 4.3 MMOL/L (ref 3.5–5.1)
PROT SERPL-MCNC: 7.3 G/DL (ref 6–8.4)
RBC # BLD AUTO: 4.9 M/UL (ref 4.6–6.2)
SODIUM SERPL-SCNC: 140 MMOL/L (ref 136–145)
WBC # BLD AUTO: 5.7 K/UL (ref 3.9–12.7)

## 2024-02-26 PROCEDURE — 84153 ASSAY OF PSA TOTAL: CPT | Performed by: INTERNAL MEDICINE

## 2024-02-26 PROCEDURE — 85025 COMPLETE CBC W/AUTO DIFF WBC: CPT | Performed by: INTERNAL MEDICINE

## 2024-02-26 PROCEDURE — 36415 COLL VENOUS BLD VENIPUNCTURE: CPT | Performed by: INTERNAL MEDICINE

## 2024-02-26 PROCEDURE — 80053 COMPREHEN METABOLIC PANEL: CPT | Performed by: INTERNAL MEDICINE

## 2024-02-27 ENCOUNTER — OFFICE VISIT (OUTPATIENT)
Dept: PRIMARY CARE CLINIC | Facility: CLINIC | Age: 59
End: 2024-02-27
Payer: COMMERCIAL

## 2024-02-27 VITALS
OXYGEN SATURATION: 97 % | BODY MASS INDEX: 39.06 KG/M2 | DIASTOLIC BLOOD PRESSURE: 80 MMHG | HEART RATE: 76 BPM | SYSTOLIC BLOOD PRESSURE: 120 MMHG | HEIGHT: 67 IN | TEMPERATURE: 98 F | RESPIRATION RATE: 18 BRPM | WEIGHT: 248.88 LBS

## 2024-02-27 DIAGNOSIS — I10 BENIGN ESSENTIAL HYPERTENSION: ICD-10-CM

## 2024-02-27 DIAGNOSIS — Z00.00 ANNUAL PHYSICAL EXAM: Primary | ICD-10-CM

## 2024-02-27 DIAGNOSIS — F41.9 ANXIETY AND DEPRESSION: ICD-10-CM

## 2024-02-27 DIAGNOSIS — F32.A ANXIETY AND DEPRESSION: ICD-10-CM

## 2024-02-27 DIAGNOSIS — E66.01 MORBID OBESITY: ICD-10-CM

## 2024-02-27 DIAGNOSIS — G47.00 INSOMNIA, UNSPECIFIED TYPE: ICD-10-CM

## 2024-02-27 DIAGNOSIS — G47.33 OSA (OBSTRUCTIVE SLEEP APNEA): ICD-10-CM

## 2024-02-27 DIAGNOSIS — M94.0 COSTOCHONDRITIS: ICD-10-CM

## 2024-02-27 DIAGNOSIS — E11.9 DIABETES MELLITUS WITHOUT COMPLICATION: ICD-10-CM

## 2024-02-27 PROCEDURE — 99396 PREV VISIT EST AGE 40-64: CPT | Mod: S$GLB,,, | Performed by: INTERNAL MEDICINE

## 2024-02-27 PROCEDURE — 1160F RVW MEDS BY RX/DR IN RCRD: CPT | Mod: CPTII,S$GLB,, | Performed by: INTERNAL MEDICINE

## 2024-02-27 PROCEDURE — 3079F DIAST BP 80-89 MM HG: CPT | Mod: CPTII,S$GLB,, | Performed by: INTERNAL MEDICINE

## 2024-02-27 PROCEDURE — 3074F SYST BP LT 130 MM HG: CPT | Mod: CPTII,S$GLB,, | Performed by: INTERNAL MEDICINE

## 2024-02-27 PROCEDURE — 99999 PR PBB SHADOW E&M-EST. PATIENT-LVL IV: CPT | Mod: PBBFAC,,, | Performed by: INTERNAL MEDICINE

## 2024-02-27 PROCEDURE — 3044F HG A1C LEVEL LT 7.0%: CPT | Mod: CPTII,S$GLB,, | Performed by: INTERNAL MEDICINE

## 2024-02-27 PROCEDURE — 99214 OFFICE O/P EST MOD 30 MIN: CPT | Mod: 25,S$GLB,, | Performed by: INTERNAL MEDICINE

## 2024-02-27 PROCEDURE — 3008F BODY MASS INDEX DOCD: CPT | Mod: CPTII,S$GLB,, | Performed by: INTERNAL MEDICINE

## 2024-02-27 PROCEDURE — 1159F MED LIST DOCD IN RCRD: CPT | Mod: CPTII,S$GLB,, | Performed by: INTERNAL MEDICINE

## 2024-02-27 PROCEDURE — 3072F LOW RISK FOR RETINOPATHY: CPT | Mod: CPTII,S$GLB,, | Performed by: INTERNAL MEDICINE

## 2024-02-27 RX ORDER — CYCLOBENZAPRINE HCL 10 MG
10 TABLET ORAL 3 TIMES DAILY PRN
Qty: 15 TABLET | Refills: 0 | Status: SHIPPED | OUTPATIENT
Start: 2024-02-27 | End: 2024-03-08

## 2024-02-27 NOTE — PROGRESS NOTES
[facility] Note    Chief Complaint      Chief Complaint   Patient presents with    Annual Exam    Flank Pain     Back lower left side        History of Present Illness      Toño Ye is a 58 y.o. male who presents today for   Chief Complaint   Patient presents with    Annual Exam    Flank Pain     Back lower left side    .  Patient comes to appointment here for annual preventative visit with me . He just had full labs done with this visit and I have reviewed all with patient today . He is getting back to his regular diet and exercise routine after having been on vacations . He is uptodate  with optho and colon screening .     He incidentally is complaining of left mid thoracic muscular pain . Is off and on rates as 5/10 is positional only sometimes he can feel with taking deep breath     HPI    No problem-specific Assessment & Plan notes found for this encounter.       Problem List Items Addressed This Visit          Psychiatric    Anxiety and depression    Overview     He will stay on welbutrin at current dose is helping tremendously            Cardiac/Vascular    Benign essential hypertension    Overview     Bp well controlled on current regimen   He is concerned about cardiovascular issues as several of his friends have had mi recently . He requests cardilogy referral             Endocrine    Diabetes mellitus without complication    Overview     a1c 6.4 little higher than last visit but he states has had 2 vacations with some dietary indiscretion          Morbid obesity    Overview     He will be getting back to his exericse and diet regimen             Orthopedic    Costochondritis    Overview     Cont iburpofwen add felxeril 10 mg tiod disp 15             Other    Annual physical exam - Primary    Overview     pe documented needs all screening labs reviewed with patient           ISAIAH (obstructive sleep apnea)    Overview     Utilizing cpap with great benefit using  allnight everynight          Insomnia    Overview     No meds currently             Past Medical History:  Past Medical History:   Diagnosis Date    Diabetes mellitus     Hyperlipidemia     Hypertension        Past Surgical History:  Past Surgical History:   Procedure Laterality Date    COLONOSCOPY  11/20/2017    COLONOSCOPY N/A 5/17/2023    Procedure: COLONOSCOPY;  Surgeon: Simone Lemus MD;  Location: [facility] ([address] [place] [address]);  Service: Colon and Rectal;  Laterality: N/A;  instr emailed/portal-GT    KNEE SURGERY         Family History:  family history includes Arthritis in his father and mother; Cancer in his father; Depression in his sister; Diabetes in his father; Drug abuse in his brother; Heart disease in his father; Hyperlipidemia in his father; Hypertension in his father and mother; Lung cancer in his father.     Social History:  Social History     Socioeconomic History    Marital status:    Tobacco Use    Smoking status: Never    Smokeless tobacco: Never   Substance and Sexual Activity    Alcohol use: Yes     Alcohol/week: 4.0 standard drinks of alcohol     Types: 2 Glasses of wine, 2 Drinks containing 0.5 oz of alcohol per week    Drug use: Never    Sexual activity: Yes     Partners: Female     Birth control/protection: None       Review of Systems:   Review of Systems   HENT:  Negative for hearing loss.    Eyes:  Negative for discharge.   Respiratory:  Negative for wheezing.    Cardiovascular:  Negative for chest pain and palpitations.   Gastrointestinal:  Negative for blood in stool, constipation, diarrhea and vomiting.   Genitourinary:  Negative for hematuria and urgency.   Musculoskeletal:  Positive for myalgias. Negative for neck pain.   Neurological:  Positive for headaches. Negative for weakness.   Endo/Heme/Allergies:  Negative for polydipsia.        Medications:  Outpatient Encounter Medications as of 2/27/2024   Medication Sig Dispense Refill    atorvastatin (LIPITOR) 20 MG tablet TAKE 1  "TABLET BY MOUTH EVERY DAY 90 tablet 3    buPROPion (WELLBUTRIN XL) 150 MG TB24 tablet Take 1 tablet (150 mg total) by mouth once daily. 90 tablet 3    fenofibrate 160 MG Tab Take 1 tablet (160 mg total) by mouth once daily. 90 tablet 3    metFORMIN (GLUCOPHAGE-XR) 500 MG ER 24hr tablet TAKE 1 TABLET BY MOUTH EVERY DAY WITH BREAKFAST 90 tablet 3    nebivoloL (BYSTOLIC) 10 MG Tab Take 1 tablet (10 mg total) by mouth once daily. 90 tablet 3    cyclobenzaprine (FLEXERIL) 10 MG tablet Take 1 tablet (10 mg total) by mouth 3 (three) times daily as needed for Muscle spasms. 15 tablet 0    [DISCONTINUED] amoxicillin-clavulanate 500-125mg (AUGMENTIN) 500-125 mg Tab Take 1 tablet by mouth 2 (two) times daily.      [DISCONTINUED] azithromycin (Z-DIEGO) 250 MG tablet TAKE 2 TABLETS BY MOUTH TODAY, THEN TAKE 1 TABLET DAILY FOR 4 DAYS      [DISCONTINUED] fluticasone propionate (FLONASE) 50 mcg/actuation nasal spray 1 spray (50 mcg total) by Each Nostril route once daily. 15.8 mL 1    [DISCONTINUED] polyethylene glycol (GOLYTELY) 236-22.74-6.74 -5.86 gram suspension Take 4,000 mLs by mouth once.      [DISCONTINUED] traZODone (DESYREL) 100 MG tablet TAKE 1 TABLET BY MOUTH EVERY DAY IN THE EVENING 90 tablet 3     No facility-administered encounter medications on file as of 2/27/2024.       Allergies:  Review of patient's allergies indicates:  No Known Allergies      Physical Exam      Vitals:    02/27/24 0959   BP: 120/80   Pulse: 76   Resp: 18   Temp: 98 °F (36.7 °C)        Vital Signs  Temp: 98 °F (36.7 °C)  Temp Source: Oral  Pulse: 76  Resp: 18  SpO2: 97 %  BP: 120/80  BP Location: Right arm  Patient Position: Sitting  Pain Score: 0-No pain  Height and Weight  Height: 5' 6.5" (168.9 cm)  Weight: 112.9 kg (248 lb 14.4 oz)  BSA (Calculated - sq m): 2.3 sq meters  BMI (Calculated): 39.6  Weight in (lb) to have BMI = 25: 156.9]     Body mass index is 39.57 kg/m².    Physical Exam  Constitutional:       Appearance: He is well-developed. " "  HENT:      Head: Normocephalic.   Eyes:      Pupils: Pupils are equal, round, and reactive to light.   Neck:      Thyroid: No thyromegaly.   Cardiovascular:      Rate and Rhythm: Normal rate and regular rhythm.      Heart sounds: No murmur heard.     No friction rub. No gallop.   Pulmonary:      Effort: Pulmonary effort is normal.      Breath sounds: Normal breath sounds.   Abdominal:      General: Bowel sounds are normal.      Palpations: Abdomen is soft.   Musculoskeletal:         General: Normal range of motion.      Cervical back: Normal range of motion.   Feet:      Right foot:      Protective Sensation: 4 sites tested.  4 sites sensed.      Left foot:      Protective Sensation: 4 sites tested.  4 sites sensed.   Skin:     General: Skin is warm and dry.   Neurological:      Mental Status: He is alert and oriented to person, place, and time.      Sensory: No sensory deficit.   Psychiatric:         Behavior: Behavior normal.          Laboratory:  CBC:  Recent Labs   Lab Result Units 02/26/24  0711   WBC K/uL 5.70   RBC M/uL 4.90   Hemoglobin g/dL 14.6   Hematocrit % 43.8   Platelets K/uL 348   MCV fL 89   MCH pg 29.8   MCHC g/dL 33.3     CMP:  Recent Labs   Lab Result Units 02/26/24  0711   Glucose mg/dL 145*   Calcium mg/dL 10.0   Albumin g/dL 4.0   Total Protein g/dL 7.3   Sodium mmol/L 140   Potassium mmol/L 4.3   CO2 mmol/L 25   Chloride mmol/L 104   BUN mg/dL 30*   Alkaline Phosphatase U/L 62   ALT U/L 51*   AST U/L 30   Total Bilirubin mg/dL 0.8     URINALYSIS:  No results for input(s): "COLORU", "CLARITYU", "SPECGRAV", "PHUR", "PROTEINUA", "GLUCOSEU", "BILIRUBINCON", "BLOODU", "WBCU", "RBCU", "BACTERIA", "MUCUS", "NITRITE", "LEUKOCYTESUR", "UROBILINOGEN", "HYALINECASTS" in the last 2160 hours.   LIPIDS:  Recent Labs   Lab Result Units 02/23/24  0735   HDL mg/dL 29*   Cholesterol mg/dL 143   Triglycerides mg/dL 239*   LDL Cholesterol mg/dL 66.2   HDL/Cholesterol Ratio % 20.3   Non-HDL Cholesterol mg/dL " "114   Total Cholesterol/HDL Ratio  4.9     TSH:  No results for input(s): "TSH" in the last 2160 hours.  A1C:  Recent Labs   Lab Result Units 02/23/24  0735   Hemoglobin A1C % 6.4*       Radiology:        Assessment:     Toño Ye is a 58 y.o.male with:    Annual physical exam    ISAIAH (obstructive sleep apnea)    Diabetes mellitus without complication    Anxiety and depression    Insomnia, unspecified type    Morbid obesity    Costochondritis  -     cyclobenzaprine (FLEXERIL) 10 MG tablet; Take 1 tablet (10 mg total) by mouth 3 (three) times daily as needed for Muscle spasms.  Dispense: 15 tablet; Refill: 0    Benign essential hypertension  -     Ambulatory referral/consult to Cardiology; Future; Expected date: 03/05/2024                Plan:     Problem List Items Addressed This Visit          Psychiatric    Anxiety and depression    Overview     He will stay on welbutrin at current dose is helping tremendously            Cardiac/Vascular    Benign essential hypertension    Overview     Bp well controlled on current regimen   He is concerned about cardiovascular issues as several of his friends have had mi recently . He requests cardilogy referral             Endocrine    Diabetes mellitus without complication    Overview     a1c 6.4 little higher than last visit but he states has had 2 vacations with some dietary indiscretion          Morbid obesity    Overview     He will be getting back to his exericse and diet regimen             Orthopedic    Costochondritis    Overview     Cont iburpofwen add felxeril 10 mg tiod disp 15             Other    Annual physical exam - Primary    Overview     pe documented needs all screening labs reviewed with patient           ISAIAH (obstructive sleep apnea)    Overview     Utilizing cpap with great benefit using allnight everynight          Insomnia    Overview     No meds currently            As above, continue current medications and maintain follow up with " specialists.  Return to clinic in 6 months.      Peter Person  [facility]                  Answers submitted by the patient for this visit:  Review of Systems Questionnaire (Submitted on 2/27/2024)  activity change: No  unexpected weight change: No  rhinorrhea: No  trouble swallowing: No  visual disturbance: No  chest tightness: No  polyuria: No  difficulty urinating: No  joint swelling: No  arthralgias: No  confusion: No  dysphoric mood: No

## 2024-03-08 ENCOUNTER — PATIENT MESSAGE (OUTPATIENT)
Dept: PRIMARY CARE CLINIC | Facility: CLINIC | Age: 59
End: 2024-03-08
Payer: COMMERCIAL

## 2024-03-08 DIAGNOSIS — M94.0 COSTOCHONDRITIS: Primary | ICD-10-CM

## 2024-03-08 RX ORDER — METHYLPREDNISOLONE 4 MG/1
TABLET ORAL
Qty: 21 EACH | Refills: 0 | Status: SHIPPED | OUTPATIENT
Start: 2024-03-08 | End: 2024-03-27

## 2024-03-12 ENCOUNTER — OFFICE VISIT (OUTPATIENT)
Dept: CARDIOLOGY | Facility: CLINIC | Age: 59
End: 2024-03-12
Payer: COMMERCIAL

## 2024-03-12 VITALS
WEIGHT: 244 LBS | DIASTOLIC BLOOD PRESSURE: 94 MMHG | HEART RATE: 77 BPM | BODY MASS INDEX: 38.3 KG/M2 | HEIGHT: 67 IN | SYSTOLIC BLOOD PRESSURE: 144 MMHG

## 2024-03-12 DIAGNOSIS — E11.9 DIABETES MELLITUS WITHOUT COMPLICATION: Primary | ICD-10-CM

## 2024-03-12 DIAGNOSIS — E66.01 MORBID OBESITY: ICD-10-CM

## 2024-03-12 DIAGNOSIS — I10 BENIGN ESSENTIAL HYPERTENSION: ICD-10-CM

## 2024-03-12 DIAGNOSIS — E78.49 OTHER HYPERLIPIDEMIA: ICD-10-CM

## 2024-03-12 PROCEDURE — 3077F SYST BP >= 140 MM HG: CPT | Mod: CPTII,S$GLB,, | Performed by: INTERNAL MEDICINE

## 2024-03-12 PROCEDURE — 1160F RVW MEDS BY RX/DR IN RCRD: CPT | Mod: CPTII,S$GLB,, | Performed by: INTERNAL MEDICINE

## 2024-03-12 PROCEDURE — 1159F MED LIST DOCD IN RCRD: CPT | Mod: CPTII,S$GLB,, | Performed by: INTERNAL MEDICINE

## 2024-03-12 PROCEDURE — 99204 OFFICE O/P NEW MOD 45 MIN: CPT | Mod: S$GLB,,, | Performed by: INTERNAL MEDICINE

## 2024-03-12 PROCEDURE — 3044F HG A1C LEVEL LT 7.0%: CPT | Mod: CPTII,S$GLB,, | Performed by: INTERNAL MEDICINE

## 2024-03-12 PROCEDURE — 3080F DIAST BP >= 90 MM HG: CPT | Mod: CPTII,S$GLB,, | Performed by: INTERNAL MEDICINE

## 2024-03-12 PROCEDURE — 93000 ELECTROCARDIOGRAM COMPLETE: CPT | Mod: S$GLB,,, | Performed by: INTERNAL MEDICINE

## 2024-03-12 PROCEDURE — 3072F LOW RISK FOR RETINOPATHY: CPT | Mod: CPTII,S$GLB,, | Performed by: INTERNAL MEDICINE

## 2024-03-12 PROCEDURE — 99999 PR PBB SHADOW E&M-EST. PATIENT-LVL III: CPT | Mod: PBBFAC,,, | Performed by: INTERNAL MEDICINE

## 2024-03-12 PROCEDURE — 3008F BODY MASS INDEX DOCD: CPT | Mod: CPTII,S$GLB,, | Performed by: INTERNAL MEDICINE

## 2024-03-12 RX ORDER — ATORVASTATIN CALCIUM 80 MG/1
80 TABLET, FILM COATED ORAL DAILY
Qty: 90 TABLET | Refills: 3 | Status: SHIPPED | OUTPATIENT
Start: 2024-03-12

## 2024-03-12 NOTE — PROGRESS NOTES
HISTORY:    57 yo M w a h/o hypertension, hyperlipidemia, and DM presenting for initial evaluation by me.    Comes in for cardiovascular event risk.    The patient denies any symptoms of chest pain, shortness of breath, or dyspnea on exertion.    Activity levels moderate. Walking 4x/week for exercise. Works full time and can be on his feet. Walks 7-10k steps/day.    Tolerates nebivolol 10 x 1, atorvastatin 20 x 1, fenofibrate 160 x 1, and metformin 500 x 1.    Patient has 2 grandchildren who he enjoys spending time with.     PHYSICAL EXAM:    Vitals:    03/12/24 0905   BP: (!) 144/94   Pulse: 77       NAD, A+Ox3.  No jvd, no bruit.  RRR nml s1,s2. No murmurs.  CTA B no wheezes or crackles.  No edema.    LABS/STUDIES (imaging reviewed during clinic visit):    February 2024 CBC and CMP normal.  LDL 66 and HDL 29.  Triglycerides 239.  A1c 6.4.  EKG March 2024 SR no Qs/Sts.      ASSESSMENT & PLAN:    1. Diabetes mellitus without complication    2. Benign essential hypertension    3. Morbid obesity    4. Other hyperlipidemia        Orders Placed This Encounter    IN OFFICE EKG 12-LEAD (to Muse)    atorvastatin (LIPITOR) 80 MG tablet        No CV symptoms.    Bps controlled on home log. Cont nebivilol 10x1.    LDL 66/HDL 29 on atorvastatin and fenofibrate. Okay tod rop fenofibrate and increase atorvastatin 80x1. Has had issues w Tgs previously. Pt reports an elevated CAC years ago near 200. Will be aggressive w RF reduction. If tolerating atorva will add on ezetimibe.     We discussed the importance of diet modifications and instituting an exercise regimen. A1c 6.4. Expect patient to reach his goal of further weight loss and diet controlled dm. Goal weight 200.     Follow up in about 1 year (around 3/12/2025).      Raquel Tapia MD

## 2024-03-15 LAB
OHS QRS DURATION: 94 MS
OHS QTC CALCULATION: 425 MS

## 2024-03-19 ENCOUNTER — PATIENT MESSAGE (OUTPATIENT)
Dept: PRIMARY CARE CLINIC | Facility: CLINIC | Age: 59
End: 2024-03-19
Payer: COMMERCIAL

## 2024-03-20 RX ORDER — PANTOPRAZOLE SODIUM 40 MG/1
40 TABLET, DELAYED RELEASE ORAL DAILY
Qty: 30 TABLET | Refills: 1 | Status: SHIPPED | OUTPATIENT
Start: 2024-03-20 | End: 2024-04-15

## 2024-03-27 ENCOUNTER — OFFICE VISIT (OUTPATIENT)
Dept: PRIMARY CARE CLINIC | Facility: CLINIC | Age: 59
End: 2024-03-27
Payer: COMMERCIAL

## 2024-03-27 ENCOUNTER — HOSPITAL ENCOUNTER (OUTPATIENT)
Dept: RADIOLOGY | Facility: HOSPITAL | Age: 59
Discharge: HOME | End: 2024-03-27
Attending: INTERNAL MEDICINE
Payer: COMMERCIAL

## 2024-03-27 VITALS
WEIGHT: 251.31 LBS | HEART RATE: 73 BPM | BODY MASS INDEX: 39.44 KG/M2 | SYSTOLIC BLOOD PRESSURE: 124 MMHG | OXYGEN SATURATION: 95 % | HEIGHT: 67 IN | RESPIRATION RATE: 18 BRPM | DIASTOLIC BLOOD PRESSURE: 82 MMHG | TEMPERATURE: 98 F

## 2024-03-27 DIAGNOSIS — R10.9 LEFT FLANK PAIN: ICD-10-CM

## 2024-03-27 DIAGNOSIS — R10.9 LEFT FLANK PAIN: Primary | ICD-10-CM

## 2024-03-27 PROCEDURE — 3079F DIAST BP 80-89 MM HG: CPT | Mod: CPTII,S$GLB,, | Performed by: INTERNAL MEDICINE

## 2024-03-27 PROCEDURE — 76700 US EXAM ABDOM COMPLETE: CPT | Mod: TC

## 2024-03-27 PROCEDURE — 3008F BODY MASS INDEX DOCD: CPT | Mod: CPTII,S$GLB,, | Performed by: INTERNAL MEDICINE

## 2024-03-27 PROCEDURE — 99999 PR PBB SHADOW E&M-EST. PATIENT-LVL IV: CPT | Mod: PBBFAC,,, | Performed by: INTERNAL MEDICINE

## 2024-03-27 PROCEDURE — 99214 OFFICE O/P EST MOD 30 MIN: CPT | Mod: S$GLB,,, | Performed by: INTERNAL MEDICINE

## 2024-03-27 PROCEDURE — 3044F HG A1C LEVEL LT 7.0%: CPT | Mod: CPTII,S$GLB,, | Performed by: INTERNAL MEDICINE

## 2024-03-27 PROCEDURE — 1159F MED LIST DOCD IN RCRD: CPT | Mod: CPTII,S$GLB,, | Performed by: INTERNAL MEDICINE

## 2024-03-27 PROCEDURE — 76700 US EXAM ABDOM COMPLETE: CPT | Mod: 26,,, | Performed by: RADIOLOGY

## 2024-03-27 PROCEDURE — 3074F SYST BP LT 130 MM HG: CPT | Mod: CPTII,S$GLB,, | Performed by: INTERNAL MEDICINE

## 2024-03-27 PROCEDURE — 3072F LOW RISK FOR RETINOPATHY: CPT | Mod: CPTII,S$GLB,, | Performed by: INTERNAL MEDICINE

## 2024-03-27 PROCEDURE — 1160F RVW MEDS BY RX/DR IN RCRD: CPT | Mod: CPTII,S$GLB,, | Performed by: INTERNAL MEDICINE

## 2024-03-27 NOTE — PROGRESS NOTES
[facility] Note    Chief Complaint      Chief Complaint   Patient presents with    Back Pain       History of Present Illness      Toño Ye is a 58 y.o. male who presents today for   Chief Complaint   Patient presents with    Back Pain   .  Patient comes to appointment here for acute visit related to above . He has had persistent left flank , rib , back pain . Treated with steroid for possible costochondritis with no benefit .     HPI    No problem-specific Assessment & Plan notes found for this encounter.       Problem List Items Addressed This Visit          GI    Left flank pain - Primary    Overview     Will check abdominal u/s will make further recs when all reviewed              Past Medical History:  Past Medical History:   Diagnosis Date    Diabetes mellitus     Hyperlipidemia     Hypertension        Past Surgical History:  Past Surgical History:   Procedure Laterality Date    COLONOSCOPY  11/20/2017    COLONOSCOPY N/A 5/17/2023    Procedure: COLONOSCOPY;  Surgeon: Simone Lemus MD;  Location: [facility] ([address] [place] [address]);  Service: Colon and Rectal;  Laterality: N/A;  instr emailed/portal-GT    KNEE SURGERY         Family History:  family history includes Arthritis in his father and mother; Cancer in his father; Depression in his sister; Diabetes in his father; Drug abuse in his brother; Heart disease in his father; Hyperlipidemia in his father; Hypertension in his father and mother; Lung cancer in his father.     Social History:  Social History     Socioeconomic History    Marital status:    Tobacco Use    Smoking status: Never    Smokeless tobacco: Never   Substance and Sexual Activity    Alcohol use: Yes     Alcohol/week: 4.0 standard drinks of alcohol     Types: 2 Glasses of wine, 2 Drinks containing 0.5 oz of alcohol per week    Drug use: Never    Sexual activity: Yes     Partners: Female     Birth control/protection: None       Review of Systems:   Review  "of Systems   Constitutional:  Negative for fever and weight loss.   HENT:  Negative for congestion, hearing loss and sore throat.    Eyes:  Negative for blurred vision.   Respiratory:  Negative for cough and shortness of breath.    Cardiovascular:  Negative for chest pain, palpitations, claudication and leg swelling.   Gastrointestinal:  Negative for abdominal pain, constipation, diarrhea and heartburn.   Genitourinary:  Negative for dysuria.   Musculoskeletal:  Negative for back pain and myalgias.   Skin:  Negative for rash.   Neurological:  Negative for focal weakness and headaches.   Psychiatric/Behavioral:  Negative for depression and suicidal ideas. The patient is not nervous/anxious.         Medications:  Outpatient Encounter Medications as of 3/27/2024   Medication Sig Dispense Refill    atorvastatin (LIPITOR) 80 MG tablet Take 1 tablet (80 mg total) by mouth once daily. 90 tablet 3    buPROPion (WELLBUTRIN XL) 150 MG TB24 tablet Take 1 tablet (150 mg total) by mouth once daily. 90 tablet 3    metFORMIN (GLUCOPHAGE-XR) 500 MG ER 24hr tablet TAKE 1 TABLET BY MOUTH EVERY DAY WITH BREAKFAST 90 tablet 3    nebivoloL (BYSTOLIC) 10 MG Tab Take 1 tablet (10 mg total) by mouth once daily. 90 tablet 3    pantoprazole (PROTONIX) 40 MG tablet Take 1 tablet (40 mg total) by mouth once daily. 30 tablet 1    [DISCONTINUED] methylPREDNISolone (MEDROL DOSEPACK) 4 mg tablet use as directed 21 each 0     No facility-administered encounter medications on file as of 3/27/2024.       Allergies:  Review of patient's allergies indicates:  No Known Allergies      Physical Exam      Vitals:    03/27/24 1053   BP: 124/82   Pulse: 73   Resp: 18   Temp: 98 °F (36.7 °C)        Vital Signs  Temp: 98 °F (36.7 °C)  Temp Source: Oral  Pulse: 73  Resp: 18  SpO2: 95 %  BP: 124/82  BP Location: Left arm  Patient Position: Sitting  Pain Score: 0-No pain  Height and Weight  Height: 5' 6.5" (168.9 cm)  Weight: 114 kg (251 lb 5.2 oz)  BSA " "(Calculated - sq m): 2.31 sq meters  BMI (Calculated): 40  Weight in (lb) to have BMI = 25: 156.9]     Body mass index is 39.96 kg/m².    Physical Exam  Constitutional:       Appearance: He is well-developed.   HENT:      Head: Normocephalic.   Eyes:      Pupils: Pupils are equal, round, and reactive to light.   Neck:      Thyroid: No thyromegaly.   Cardiovascular:      Rate and Rhythm: Normal rate and regular rhythm.      Heart sounds: No murmur heard.     No friction rub. No gallop.   Pulmonary:      Effort: Pulmonary effort is normal.      Breath sounds: Normal breath sounds.   Abdominal:      General: Bowel sounds are normal.      Palpations: Abdomen is soft.   Musculoskeletal:         General: Normal range of motion.      Cervical back: Normal range of motion.   Skin:     General: Skin is warm and dry.   Neurological:      Mental Status: He is alert and oriented to person, place, and time.      Sensory: No sensory deficit.   Psychiatric:         Behavior: Behavior normal.          Laboratory:  CBC:  Recent Labs   Lab Result Units 02/26/24  0711   WBC K/uL 5.70   RBC M/uL 4.90   Hemoglobin g/dL 14.6   Hematocrit % 43.8   Platelets K/uL 348   MCV fL 89   MCH pg 29.8   MCHC g/dL 33.3     CMP:  Recent Labs   Lab Result Units 02/26/24  0711   Glucose mg/dL 145*   Calcium mg/dL 10.0   Albumin g/dL 4.0   Total Protein g/dL 7.3   Sodium mmol/L 140   Potassium mmol/L 4.3   CO2 mmol/L 25   Chloride mmol/L 104   BUN mg/dL 30*   Alkaline Phosphatase U/L 62   ALT U/L 51*   AST U/L 30   Total Bilirubin mg/dL 0.8     URINALYSIS:  No results for input(s): "COLORU", "CLARITYU", "SPECGRAV", "PHUR", "PROTEINUA", "GLUCOSEU", "BILIRUBINCON", "BLOODU", "WBCU", "RBCU", "BACTERIA", "MUCUS", "NITRITE", "LEUKOCYTESUR", "UROBILINOGEN", "HYALINECASTS" in the last 2160 hours.   LIPIDS:  Recent Labs   Lab Result Units 02/23/24  0735   HDL mg/dL 29*   Cholesterol mg/dL 143   Triglycerides mg/dL 239*   LDL Cholesterol mg/dL 66.2 " "  HDL/Cholesterol Ratio % 20.3   Non-HDL Cholesterol mg/dL 114   Total Cholesterol/HDL Ratio  4.9     TSH:  No results for input(s): "TSH" in the last 2160 hours.  A1C:  Recent Labs   Lab Result Units 02/23/24  0735   Hemoglobin A1C % 6.4*       Radiology:        Assessment:     Toño Ye is a 58 y.o.male with:    Left flank pain  -     US Abdomen Complete; Future; Expected date: 03/27/2024                Plan:     Problem List Items Addressed This Visit          GI    Left flank pain - Primary    Overview     Will check abdominal u/s will make further recs when all reviewed             As above, continue current medications and maintain follow up with specialists.  Return to clinic as scheduled       Frederick W Dantagnan [facility]                  "

## 2024-04-12 NOTE — TELEPHONE ENCOUNTER
No care due was identified.  [facility] Embedded Care Due Messages. Reference number: [id number].   4/12/2024 1:34:47 PM CDT

## 2024-04-13 NOTE — TELEPHONE ENCOUNTER
Refill Routing Note   Medication(s) are not appropriate for processing by [facility] for the following reason(s):        New or recently adjusted medication    [facility] action(s):  Defer             Appointments  past 12m or future 3m with PCP    Date Provider   Last Visit   3/27/2024 Peter Person MD   Next Visit   8/27/2024 Peter Person MD   ED visits in past 90 days: 0        Note composed:6:48 PM 04/13/2024

## 2024-04-15 RX ORDER — PANTOPRAZOLE SODIUM 40 MG/1
40 TABLET, DELAYED RELEASE ORAL
Qty: 90 TABLET | Refills: 3 | Status: SHIPPED | OUTPATIENT
Start: 2024-04-15

## 2024-04-26 ENCOUNTER — HOSPITAL ENCOUNTER (OUTPATIENT)
Dept: RADIOLOGY | Facility: HOSPITAL | Age: 59
Discharge: HOME | End: 2024-04-26
Attending: EMERGENCY MEDICINE
Payer: COMMERCIAL

## 2024-04-26 ENCOUNTER — OFFICE VISIT (OUTPATIENT)
Dept: PAIN MEDICINE | Facility: CLINIC | Age: 59
End: 2024-04-26
Payer: COMMERCIAL

## 2024-04-26 VITALS
BODY MASS INDEX: 39.28 KG/M2 | SYSTOLIC BLOOD PRESSURE: 148 MMHG | WEIGHT: 250.25 LBS | DIASTOLIC BLOOD PRESSURE: 82 MMHG | HEART RATE: 76 BPM | HEIGHT: 67 IN

## 2024-04-26 DIAGNOSIS — G58.8 INTERCOSTAL NEURALGIA: Primary | ICD-10-CM

## 2024-04-26 DIAGNOSIS — G58.8 INTERCOSTAL NEURALGIA: ICD-10-CM

## 2024-04-26 PROCEDURE — 99204 OFFICE O/P NEW MOD 45 MIN: CPT | Mod: S$GLB,,, | Performed by: EMERGENCY MEDICINE

## 2024-04-26 PROCEDURE — 71046 X-RAY EXAM CHEST 2 VIEWS: CPT | Mod: 26,,, | Performed by: INTERNAL MEDICINE

## 2024-04-26 PROCEDURE — 3079F DIAST BP 80-89 MM HG: CPT | Mod: CPTII,S$GLB,, | Performed by: EMERGENCY MEDICINE

## 2024-04-26 PROCEDURE — 71100 X-RAY EXAM RIBS UNI 2 VIEWS: CPT | Mod: TC,FY,LT

## 2024-04-26 PROCEDURE — 71100 X-RAY EXAM RIBS UNI 2 VIEWS: CPT | Mod: 26,LT,, | Performed by: INTERNAL MEDICINE

## 2024-04-26 PROCEDURE — 3008F BODY MASS INDEX DOCD: CPT | Mod: CPTII,S$GLB,, | Performed by: EMERGENCY MEDICINE

## 2024-04-26 PROCEDURE — 99999 PR PBB SHADOW E&M-EST. PATIENT-LVL IV: CPT | Mod: PBBFAC,,, | Performed by: EMERGENCY MEDICINE

## 2024-04-26 PROCEDURE — 3077F SYST BP >= 140 MM HG: CPT | Mod: CPTII,S$GLB,, | Performed by: EMERGENCY MEDICINE

## 2024-04-26 PROCEDURE — 1159F MED LIST DOCD IN RCRD: CPT | Mod: CPTII,S$GLB,, | Performed by: EMERGENCY MEDICINE

## 2024-04-26 PROCEDURE — 3044F HG A1C LEVEL LT 7.0%: CPT | Mod: CPTII,S$GLB,, | Performed by: EMERGENCY MEDICINE

## 2024-04-26 PROCEDURE — 71046 X-RAY EXAM CHEST 2 VIEWS: CPT | Mod: TC,FY

## 2024-04-26 PROCEDURE — 3072F LOW RISK FOR RETINOPATHY: CPT | Mod: CPTII,S$GLB,, | Performed by: EMERGENCY MEDICINE

## 2024-04-26 RX ORDER — GABAPENTIN 300 MG/1
CAPSULE ORAL
Qty: 69 CAPSULE | Refills: 0 | Status: SHIPPED | OUTPATIENT
Start: 2024-04-26 | End: 2024-05-20 | Stop reason: SDUPTHER

## 2024-04-26 NOTE — PROGRESS NOTES
Chief Complaint   Patient presents with    Low-back Pain    Mid-back Pain        Original HPI 04/26/24: Toño Ye is a 58 y.o. year old male patient who has a past medical history of Diabetes mellitus, Hyperlipidemia, and Hypertension. He presents in referral from [facility] for lower back pain for the past 3 months.     Original Pain Description:  The pain is located in the left mid and lower back and is axial in nature. The pain is described as dull. Exacerbating factors: lateral movements, but not flex / ext. Mitigating factors rest. Symptoms interfere with daily activity. The patient feels like symptoms have been unchanged. Patient denies night fever/night sweats, urinary incontinence, bowel incontinence, significant weight loss, significant motor weakness, and loss of sensations. Steroids, muscle relaxers, and PPIs have been prescribed without helping his pain. He was evaluated by his Orthopedist who didn't see an obvious cause of his pain. Chiropractor was not able to help his pain. Reports pain is present if he jumps.     PAIN SCORES:  Best: Pain is 0  Current: Pain is 2  Worst: Pain is 4        4/26/2024     9:29 AM   Last 3 PDI Scores   Pain Disability Index (PDI) 35       6 weeks of Conservative therapy:  PT: Not yet  Chiro: Recently, dry needle  HEP: Participating    Treatments / Medications: (Ice/Heat/NSAIDS/APAP/etc):  Tylenol PRN  IBU PRN    Antiplatelets/Anticoagulants:  No    Interventional Pain Procedures: (Previous injections)  NA    IMAGING:    NA  Past Surgical History:   Procedure Laterality Date    COLONOSCOPY  11/20/2017    COLONOSCOPY N/A 5/17/2023    Procedure: COLONOSCOPY;  Surgeon: Simone Lemus MD;  Location: [facility] ([address] [place] [address]);  Service: Colon and Rectal;  Laterality: N/A;  instr emailed/portal-GT    KNEE SURGERY         Social History     Socioeconomic History    Marital status:    Tobacco Use    Smoking status: Never    Smokeless tobacco: Never  "  Substance and Sexual Activity    Alcohol use: Yes     Alcohol/week: 4.0 standard drinks of alcohol     Types: 2 Glasses of wine, 2 Drinks containing 0.5 oz of alcohol per week    Drug use: Never    Sexual activity: Yes     Partners: Female     Birth control/protection: None       Medications/Allergies: See med card    ROS:  GENERAL: No fever. No chills. No fatigue. Denies weight loss. Denies weight gain.  Back / musculoskeletal / neuro : See HPI    VITALS:   Vitals:    04/26/24 0928   BP: (!) 148/82   Pulse: 76   Weight: 113.5 kg (250 lb 3.6 oz)   Height: 5' 6.5" (1.689 m)   PainSc:   5   PainLoc: Back     Body mass index is 39.78 kg/m².      4/26/2024     9:29 AM   Last 3 PDI Scores   Pain Disability Index (PDI) 35       PHYSICAL EXAM:   GENERAL: Well appearing, in no acute distress, alert and oriented x3.  PSYCH:  Mood and affect appropriate.  SKIN: Skin color, texture, turgor normal, no rashes or lesions.  HEENT:  Normocephalic, atraumatic. Cranial nerves grossly intact.  NECK: No pain to palpation over the cervical paraspinous muscles. No pain to palpation over facets. No pain with neck flexion, extension, or lateral flexion.   PULM: No evidence of respiratory difficulty, symmetric chest rise.  GI:  Non-distended  BACK: Normal range of motion. No pain to palpation over the spinous processes. No pain to palpation over facet joints. There is no pain with palpation over the sacroiliac joints bilaterally. TTP along the lower left intercostal area which reproduces his pain, pain reproduced with lateral flexion and extension  EXTREMITIES: No deformities, edema, or skin discoloration.   MUSCULOSKELETAL: Shoulder, hip, and knee provocative maneuvers are negative. No atrophy is noted.  NEURO: Sensation is equal and appropriate bilaterally. Bilateral upper and lower extremity strength is normal and symmetric. Bilateral upper and lower extremity coordination and muscle stretch reflexes are physiologic and symmetric. " Plantar response are downgoing. Straight leg raising in the supine position is negative to radicular pain.   GAIT: normal.      LABS:    Lab Results   Component Value Date    HGBA1C 6.4 (H) 02/23/2024       Lab Results   Component Value Date    WBC 5.70 02/26/2024    HGB 14.6 02/26/2024    HCT 43.8 02/26/2024    MCV 89 02/26/2024     02/26/2024           ASSESSMENT: 58 y.o. year old male with pain, consistent with:    No diagnosis found.    DISCUSSION: Toño Ye is a patient who comes to us with left intercostal neuralgia     PLAN:  - I have stressed the importance of physical activity and a home exercise plan to help with pain and improve health.  - Patient can continue with medications for now since they are providing benefits, using them appropriately, and without side effects.  - Counseled patient regarding the importance of activity modification and physical therapy.  - Medications: Start Neurontin 300mg gradually to three times a day. Explained titration schedule with starting nightly and increase dose gradually to tolerance without adverse effects.     OTC - Lidocaine 4% patches, Capsaicin cream and Voltaren gel 1%  - Therapy: for left ICN  - Imaging: Reviewed available imaging with patient and answered any questions they had regarding study. CXR and left ribs  - Records: Obtain x-ray thoracic spine from outside physicians   - The patient's pathophysiology was explained in detail with reference to x-rays, models, other visual aids as appropriate.   - Follow up visit: return to clinic in 4 weeks with SIOMARA Roper MD  04/26/2024

## 2024-05-15 ENCOUNTER — CLINICAL SUPPORT (OUTPATIENT)
Dept: REHABILITATION | Facility: HOSPITAL | Age: 59
End: 2024-05-15
Attending: EMERGENCY MEDICINE
Payer: COMMERCIAL

## 2024-05-15 DIAGNOSIS — G58.8 INTERCOSTAL NEURALGIA: ICD-10-CM

## 2024-05-15 DIAGNOSIS — R29.3 POSTURAL IMBALANCE: ICD-10-CM

## 2024-05-15 DIAGNOSIS — M25.60 DECREASED MOBILITY OF JOINT: Primary | ICD-10-CM

## 2024-05-15 PROCEDURE — 97161 PT EVAL LOW COMPLEX 20 MIN: CPT

## 2024-05-15 PROCEDURE — 97110 THERAPEUTIC EXERCISES: CPT

## 2024-05-15 PROCEDURE — 97140 MANUAL THERAPY 1/> REGIONS: CPT

## 2024-05-15 NOTE — PROGRESS NOTES
[facility]   Physical Therapy Initial Evaluation      Name: Toño Penalozarez  Clinic Number: [id number]    Therapy Diagnosis:   Encounter Diagnoses   Name Primary?    Intercostal neuralgia     Decreased mobility of joint Yes    Postural imbalance         Physician: Adonay Canales MD    Physician Orders: PT Eval and Treat   Medical Diagnosis from Referral: G58.8 (ICD-10-CM) - Intercostal neuralgia   Evaluation Date: 5/15/2024  Authorization Period Expiration: 4/26/2025  Plan of Care Expiration: 7/15/2024  Progress Note Due: 6/17/2024  Date of Surgery: None  Visit # / Visits authorized: 1/ 1   FOTO: 1/ 3  Precautions: Standard and Diabetes *Hypertension  Time In: 9:05 am  Time Out: 10:00 am  Total Billable Time: 55 minutes    Subjective   Date of onset: Februrary 2024  History of current condition - Toño reports: experiencing some increased left-sided lower thoracic pain. He reporhe has been taking medication prescribed by Dr. Canales for 3 weeks, and he feels that sometimes it helps, and sometimes it does not. Muscle relaxers and steroids have not helped. He also went to a chiropractor, who did some adjustments and some dry needling, and states that did not help. He reports that in the last month, he feels more bloated, and has not been as regular as he used to be.  Falls: None  Imaging: X-Ray: FINDINGS:  No skin marker was placed to indicate the area of pain.  No left-sided rib fracture identified.  Mild degenerative changes in the left shoulder.    Prior Therapy: Chiropractice  Social History: Lives with their spouse  Occupation: [employer]  Prior Level of Function: Independent  Current Level of Function: Independent with caution    Pain:  Current 3/10, worst 5/10, best 1/10   Location: Left back (  Description: Aching and Dull (Sharp on occasion); No numbness and tingling  Aggravating Factors: Sidebending (sometimes with taking in a deep breath)  *Toño notes no pain with  "coughing or sneezing. No change in symptoms with eating, bowel movements, or constant pain.  Easing Factors: Nothing (constant), "Regular activities of the day while he is not thinking about it)    Patients goals: To get rid of the pain.     Medical History:   Past Medical History:   Diagnosis Date    Diabetes mellitus     Hyperlipidemia     Hypertension        Surgical History:   Toño Ye  has a past surgical history that includes Knee surgery; Colonoscopy (11/20/2017); and Colonoscopy (N/A, 5/17/2023).    Medications:   Toño has a current medication list which includes the following prescription(s): atorvastatin, bupropion, gabapentin, metformin, nebivolol, and pantoprazole.    Allergies:   Review of patient's allergies indicates:  No Known Allergies     Objective       Right Left   Biceps Brachii -           -   Brachioradialis -          -   Triceps Brachii -          -   Patellar Tendon 2+          2+   [place]'s Tendon 2+          2+     Postural Observation: Forward head (lower cervical flexion, upper cervical extension), with thoracic kyphosis.    Lumbar AROM Pain/Dysfunction with movement   Flexion  (45-50 degrees) Full range of motion  degrees No concordant pain   Extension  (25 degrees) Slight limitation in range of motion  degrees No concordant pain   Right Side Bending  (20 degrees) Slight limitation in range of motion  degrees No concordant pain   Left Side Bending  (20 degrees) Slight limitation in range of motion  degrees No concordant pain   Right Rotation   Slight limitation in range of motion  degrees No concordant pain   Left Rotation Slight limitation in range of motion  degrees *No concordant pain     Thoracic AROM Pain/Dysfunction with movement   Flexion  (45-50 degrees) Full range of motion  degrees No concordant pain   Extension  (25 degrees) Unable to fully extend *Increased concordant left-sided pain   Right Side Bending  (20 degrees) Full range of motion  degrees  "   Left Side Bending  (20 degrees) 50% range of motion  degrees Increased concordant pain   Right Rotation   75% degrees    Left Rotation 50% degrees *Increased concordant left-sided pain     **Joint Mobility Assessment: Hypomobility of upper, middle, and lower thoracic spine, with concordant pain sites noted at multiple locations including L1-L2, L2-L3 T12-L1, T11-T12, Rib 12, Rib 11.  Intake Outcome Measure for FOTO - Survey    Therapist reviewed FOTO scores for Toño Ye on 5/15/2024.   FOTO report - see Media section or FOTO account episode details.    Intake Score: -%       Treatment     Total Treatment time (time-based codes) separate from Evaluation: 20 minutes     Toño received the treatments listed below:      therapeutic exercises to develop ROM, flexibility, and posture for 10 minutes including:  Open Books, sidelying (Home exercise program)  Seated Thoracic Extension Self-Mobilizations (Home exercise program)  Barclay Carries, 10#, 2 laps for 20 feet on right side     manual therapy techniques: Joint mobilizations were applied for 10 minutes, including:  Prone Thoracic Grade V Manipulation  Rib 9 Grade V Manipulation    Patient Education and Home Exercises     Education provided:   - -Findings of evaluation and examination, and affect of these on plan for treatment  -Prognosis and expectations  -Role of PT and team-centered care for patient  -Home exercise program and expectations of therapy      Written Home Exercises Provided: yes. Exercises were reviewed and Toño was able to demonstrate them prior to the end of the session.  Toño demonstrated good  understanding of the education provided. See EMR under Patient Instructions for exercises provided during therapy sessions.    Assessment     Toño is a 58 y.o. male referred to outpatient Physical Therapy with a medical diagnosis of G58.8 (ICD-10-CM) - Intercostal neuralgia . Patient presents with left-sided sidebending, extension,  left-sided rotation, due to postural imbalance, decreased active range of motion of the thoracic and lumbar spine, hypomobility of the thoracic spinal segments and thoracolumbar junction. Toño will benefit from a customized physical therapy plan of care  to address the aforementioned impairments in an effort to improve human function and quality of life.      Patient prognosis is Good.   Patient will benefit from skilled outpatient Physical Therapy to address the deficits stated above and in the chart below, provide patient /family education, and to maximize patientt's level of independence.     Plan of care discussed with patient: Yes  Patient's spiritual, cultural and educational needs considered and patient is agreeable to the plan of care and goals as stated below:     Anticipated Barriers for therapy: None    Medical Necessity is demonstrated by the following  History  Co-morbidities and personal factors that may impact the plan of care [] LOW: no personal factors / co-morbidities  [x] MODERATE: 1-2 personal factors / co-morbidities  [] HIGH: 3+ personal factors / co-morbidities    Moderate / High Support Documentation:   Co-morbidities affecting plan of care: See Medical History    Personal Factors:   age     Examination  Body Structures and Functions, activity limitations and participation restrictions that may impact the plan of care [x] LOW: addressing 1-2 elements  [] MODERATE: 3+ elements  [] HIGH: 4+ elements (please support below)    Moderate / High Support Documentation: None     Clinical Presentation [x] LOW: stable  [] MODERATE: Evolving  [] HIGH: Unstable     Decision Making/ Complexity Score: low       Goals:  Short Term Goals: 2 weeks   1.) Patient will demonstrate independence in compliance and technique of home exercise program provided as per teach-back method of assessment.  2.) Patient will demonstrate a 5% improvement as per FOTO score to demonstrate improvements in perceived functional  ability.    Long Term Goals: 4 weeks   1.) Patient will demonstrate independence in compliance and technique of home exercise program provided as per teach-back method of assessment.  2.) Patient will demonstrate a 10% improvement as per FOTO score to demonstrate improvements in perceived functional ability.  3.) Toño will demonstrate no increase in pain with thoracic extension, left-sided rotation and sidebending.  Plan     Plan of care Certification: 5/15/2024 to 7/15/2024.    Outpatient Physical Therapy 1 times weekly for 6 weeks to include the following interventions: Gait Training, Manual Therapy, Moist Heat/ Ice, Neuromuscular Re-ed, Patient Education, Self Care, Therapeutic Activities, and Therapeutic Exercise.     Viviana Gutierrez PT DPT  Board Certified in Orthopedic Physical Therapy          Physician's Signature: _________________________________________ Date: ________________

## 2024-05-20 RX ORDER — GABAPENTIN 300 MG/1
CAPSULE ORAL
Qty: 69 CAPSULE | Refills: 0 | Status: SHIPPED | OUTPATIENT
Start: 2024-05-20 | End: 2024-06-18

## 2024-05-24 NOTE — PROGRESS NOTES
Interval Update: 05/27/24 Patient returns to clinic for left intercostal neuralgia,   He has started physical therapy which has mildly improved his symptoms.  His pain score today is 0/10.  He does report that the majority of his relief has come from beginning gabapentin he is currently taking 300 mg t.I.d. but does report that he would like to reduce his dose.  Educated patient on gabapentin and taking it consistently can help continue to reduce his symptoms.   Overall he is reporting feeling much better and is sleeping well.  He denies any new pain denies profound weakness denies any bowel bladder dysfunction at this time.        Chief Complaint   Patient presents with    Back Pain        Original HPI 04/26/24: Toño Ye is a 58 y.o. year old male patient who has a past medical history of Diabetes mellitus, Hyperlipidemia, and Hypertension. He presents in referral from No ref. provider found for lower back pain for the past 3 months.     Original Pain Description:  The pain is located in the left mid and lower back and is axial in nature. The pain is described as dull. Exacerbating factors: lateral movements, but not flex / ext. Mitigating factors rest. Symptoms interfere with daily activity. The patient feels like symptoms have been unchanged. Patient denies night fever/night sweats, urinary incontinence, bowel incontinence, significant weight loss, significant motor weakness, and loss of sensations. Steroids, muscle relaxers, and PPIs have been prescribed without helping his pain. He was evaluated by his Orthopedist who didn't see an obvious cause of his pain. Chiropractor was not able to help his pain. Reports pain is present if he jumps.     PAIN SCORES:  Best: Pain is 0  Current: Pain is 2  Worst: Pain is 4        4/26/2024     9:29 AM   Last 3 PDI Scores   Pain Disability Index (PDI) 35       6 weeks of Conservative therapy:  PT: Not yet  Chiro: Recently, dry needle  HEP:  Participating    Treatments / Medications: (Ice/Heat/NSAIDS/APAP/etc):  Tylenol PRN  IBU PRN    Antiplatelets/Anticoagulants:  No    Interventional Pain Procedures: (Previous injections)  NA    IMAGING:    NA  Past Surgical History:   Procedure Laterality Date    COLONOSCOPY  11/20/2017    COLONOSCOPY N/A 5/17/2023    Procedure: COLONOSCOPY;  Surgeon: Simone Lemus MD;  Location: [facility] ([address] [place] [address]);  Service: Colon and Rectal;  Laterality: N/A;  instr emailed/portal-GT    KNEE SURGERY         Social History     Socioeconomic History    Marital status:    Tobacco Use    Smoking status: Never    Smokeless tobacco: Never   Substance and Sexual Activity    Alcohol use: Yes     Alcohol/week: 4.0 standard drinks of alcohol     Types: 2 Glasses of wine, 2 Drinks containing 0.5 oz of alcohol per week    Drug use: Never    Sexual activity: Yes     Partners: Female     Birth control/protection: None       Medications/Allergies: See med card    ROS:  GENERAL: No fever. No chills. No fatigue. Denies weight loss. Denies weight gain.  Back / musculoskeletal / neuro : See HPI    VITALS:   There were no vitals filed for this visit.    There is no height or weight on file to calculate BMI.      4/26/2024     9:29 AM   Last 3 PDI Scores   Pain Disability Index (PDI) 35       PHYSICAL EXAM:   GENERAL: Well appearing, in no acute distress, alert and oriented x3.  PSYCH:  Mood and affect appropriate.  SKIN: Skin color, texture, turgor normal, no rashes or lesions.  HEENT:  Normocephalic, atraumatic. Cranial nerves grossly intact.  NECK: No pain to palpation over the cervical paraspinous muscles. No pain to palpation over facets. No pain with neck flexion, extension, or lateral flexion.   PULM: No evidence of respiratory difficulty, symmetric chest rise.  GI:  Non-distended  BACK: Normal range of motion. No pain to palpation over the spinous processes. No pain to palpation over facet joints. There is no pain with palpation  over the sacroiliac joints bilaterally. TTP along the lower left intercostal area which reproduces his pain, pain reproduced with lateral flexion and extension  EXTREMITIES: No deformities, edema, or skin discoloration.   MUSCULOSKELETAL: Shoulder, hip, and knee provocative maneuvers are negative. No atrophy is noted.  NEURO: Sensation is equal and appropriate bilaterally. Bilateral upper and lower extremity strength is normal and symmetric. Bilateral upper and lower extremity coordination and muscle stretch reflexes are physiologic and symmetric. Plantar response are downgoing. Straight leg raising in the supine position is negative to radicular pain.   GAIT: normal.      LABS:    Lab Results   Component Value Date    HGBA1C 6.4 (H) 02/23/2024       Lab Results   Component Value Date    WBC 5.70 02/26/2024    HGB 14.6 02/26/2024    HCT 43.8 02/26/2024    MCV 89 02/26/2024     02/26/2024           ASSESSMENT: 58 y.o. year old male with pain, consistent with:    Encounter Diagnosis   Name Primary?    Intercostal neuralgia Yes       DISCUSSION: Toño Ye is a patient who comes to us with left intercostal neuralgia     PLAN:  - I have stressed the importance of physical activity and a home exercise plan to help with pain and improve health.  - Patient can continue with medications for now since they are providing benefits, using them appropriately, and without side effects.  - Counseled patient regarding the importance of activity modification and physical therapy.  - Medications:    Continuing gabapentin optimize dose to 300 mg q.h.s. if this does not cover his symptoms he will increase to b.I.d and upward to TID if  necessary.    OTC - Lidocaine 4% patches, Capsaicin cream and Voltaren gel 1%  - Therapy:  Continue for left ICN  - Imaging: Reviewed available imaging with patient and answered any questions they had regarding study. CXR and left ribs  - Records:  reviewed and discussed in  detail.  - The patient's pathophysiology was explained in detail with reference to x-rays, models, other visual aids as appropriate.   - Follow up visit:   3-4 months or sooner if needed      DOMENIC Roper  Interventional Pain Management    05/24/2024

## 2024-05-25 NOTE — PLAN OF CARE
[facility]   Physical Therapy Initial Evaluation      Name: Toño Penalozarez  Clinic Number: [id number]    Therapy Diagnosis:   Encounter Diagnoses   Name Primary?    Intercostal neuralgia     Decreased mobility of joint Yes    Postural imbalance         Physician: Adonay Canales MD    Physician Orders: PT Eval and Treat   Medical Diagnosis from Referral: G58.8 (ICD-10-CM) - Intercostal neuralgia   Evaluation Date: 5/15/2024  Authorization Period Expiration: 4/26/2025  Plan of Care Expiration: 7/15/2024  Progress Note Due: 6/17/2024  Date of Surgery: None  Visit # / Visits authorized: 1/ 1   FOTO: 1/ 3  Precautions: Standard and Diabetes *Hypertension  Time In: 9:05 am  Time Out: 10:00 am  Total Billable Time: 55 minutes    Subjective   Date of onset: Februrary 2024  History of current condition - Toño reports: experiencing some increased left-sided lower thoracic pain. He reporhe has been taking medication prescribed by Dr. Canales for 3 weeks, and he feels that sometimes it helps, and sometimes it does not. Muscle relaxers and steroids have not helped. He also went to a chiropractor, who did some adjustments and some dry needling, and states that did not help. He reports that in the last month, he feels more bloated, and has not been as regular as he used to be.  Falls: None  Imaging: X-Ray: FINDINGS:  No skin marker was placed to indicate the area of pain.  No left-sided rib fracture identified.  Mild degenerative changes in the left shoulder.    Prior Therapy: Chiropractice  Social History: Lives with their spouse  Occupation: [employer]  Prior Level of Function: Independent  Current Level of Function: Independent with caution    Pain:  Current 3/10, worst 5/10, best 1/10   Location: Left back (  Description: Aching and Dull (Sharp on occasion); No numbness and tingling  Aggravating Factors: Sidebending (sometimes with taking in a deep breath)  *Toño notes no pain with  "coughing or sneezing. No change in symptoms with eating, bowel movements, or constant pain.  Easing Factors: Nothing (constant), "Regular activities of the day while he is not thinking about it)    Patients goals: To get rid of the pain.     Medical History:   Past Medical History:   Diagnosis Date    Diabetes mellitus     Hyperlipidemia     Hypertension        Surgical History:   Toño Ye  has a past surgical history that includes Knee surgery; Colonoscopy (11/20/2017); and Colonoscopy (N/A, 5/17/2023).    Medications:   Toño has a current medication list which includes the following prescription(s): atorvastatin, bupropion, gabapentin, metformin, nebivolol, and pantoprazole.    Allergies:   Review of patient's allergies indicates:  No Known Allergies     Objective       Right Left   Biceps Brachii -           -   Brachioradialis -          -   Triceps Brachii -          -   Patellar Tendon 2+          2+   Glade's Tendon 2+          2+     Postural Observation: Forward head (lower cervical flexion, upper cervical extension), with thoracic kyphosis.    Lumbar AROM Pain/Dysfunction with movement   Flexion  (45-50 degrees) Full range of motion  degrees No concordant pain   Extension  (25 degrees) Slight limitation in range of motion  degrees No concordant pain   Right Side Bending  (20 degrees) Slight limitation in range of motion  degrees No concordant pain   Left Side Bending  (20 degrees) Slight limitation in range of motion  degrees No concordant pain   Right Rotation   Slight limitation in range of motion  degrees No concordant pain   Left Rotation Slight limitation in range of motion  degrees *No concordant pain     Thoracic AROM Pain/Dysfunction with movement   Flexion  (45-50 degrees) Full range of motion  degrees No concordant pain   Extension  (25 degrees) Unable to fully extend *Increased concordant left-sided pain   Right Side Bending  (20 degrees) Full range of motion  degrees  "   Left Side Bending  (20 degrees) 50% range of motion  degrees Increased concordant pain   Right Rotation   75% degrees    Left Rotation 50% degrees *Increased concordant left-sided pain     **Joint Mobility Assessment: Hypomobility of upper, middle, and lower thoracic spine, with concordant pain sites noted at multiple locations including L1-L2, L2-L3 T12-L1, T11-T12, Rib 12, Rib 11.  Intake Outcome Measure for FOTO - Survey    Therapist reviewed FOTO scores for Toño Ye on 5/15/2024.   FOTO report - see Media section or FOTO account episode details.    Intake Score: -%       Treatment     Total Treatment time (time-based codes) separate from Evaluation: 20 minutes     Toño received the treatments listed below:      therapeutic exercises to develop ROM, flexibility, and posture for 10 minutes including:  Open Books, sidelying (Home exercise program)  Seated Thoracic Extension Self-Mobilizations (Home exercise program)  Barclay Carries, 10#, 2 laps for 20 feet on right side     manual therapy techniques: Joint mobilizations were applied for 10 minutes, including:  Prone Thoracic Grade V Manipulation  Rib 9 Grade V Manipulation    Patient Education and Home Exercises     Education provided:   - -Findings of evaluation and examination, and affect of these on plan for treatment  -Prognosis and expectations  -Role of PT and team-centered care for patient  -Home exercise program and expectations of therapy      Written Home Exercises Provided: yes. Exercises were reviewed and Toño was able to demonstrate them prior to the end of the session.  Toño demonstrated good  understanding of the education provided. See EMR under Patient Instructions for exercises provided during therapy sessions.    Assessment     Toño is a 58 y.o. male referred to outpatient Physical Therapy with a medical diagnosis of G58.8 (ICD-10-CM) - Intercostal neuralgia . Patient presents with left-sided sidebending, extension,  left-sided rotation, due to postural imbalance, decreased active range of motion of the thoracic and lumbar spine, hypomobility of the thoracic spinal segments and thoracolumbar junction. Toño will benefit from a customized physical therapy plan of care  to address the aforementioned impairments in an effort to improve human function and quality of life.      Patient prognosis is Good.   Patient will benefit from skilled outpatient Physical Therapy to address the deficits stated above and in the chart below, provide patient /family education, and to maximize patientt's level of independence.     Plan of care discussed with patient: Yes  Patient's spiritual, cultural and educational needs considered and patient is agreeable to the plan of care and goals as stated below:     Anticipated Barriers for therapy: None    Medical Necessity is demonstrated by the following  History  Co-morbidities and personal factors that may impact the plan of care [] LOW: no personal factors / co-morbidities  [x] MODERATE: 1-2 personal factors / co-morbidities  [] HIGH: 3+ personal factors / co-morbidities    Moderate / High Support Documentation:   Co-morbidities affecting plan of care: See Medical History    Personal Factors:   age     Examination  Body Structures and Functions, activity limitations and participation restrictions that may impact the plan of care [x] LOW: addressing 1-2 elements  [] MODERATE: 3+ elements  [] HIGH: 4+ elements (please support below)    Moderate / High Support Documentation: None     Clinical Presentation [x] LOW: stable  [] MODERATE: Evolving  [] HIGH: Unstable     Decision Making/ Complexity Score: low       Goals:  Short Term Goals: 2 weeks   1.) Patient will demonstrate independence in compliance and technique of home exercise program provided as per teach-back method of assessment.  2.) Patient will demonstrate a 5% improvement as per FOTO score to demonstrate improvements in perceived functional  ability.    Long Term Goals: 4 weeks   1.) Patient will demonstrate independence in compliance and technique of home exercise program provided as per teach-back method of assessment.  2.) Patient will demonstrate a 10% improvement as per FOTO score to demonstrate improvements in perceived functional ability.  3.) Toño will demonstrate no increase in pain with thoracic extension, left-sided rotation and sidebending.  Plan     Plan of care Certification: 5/15/2024 to 7/15/2024.    Outpatient Physical Therapy 1 times weekly for 6 weeks to include the following interventions: Gait Training, Manual Therapy, Moist Heat/ Ice, Neuromuscular Re-ed, Patient Education, Self Care, Therapeutic Activities, and Therapeutic Exercise.     Viviana Gutierrez PT DPT  Board Certified in Orthopedic Physical Therapy          Physician's Signature: _________________________________________ Date: ________________

## 2024-05-27 ENCOUNTER — OFFICE VISIT (OUTPATIENT)
Dept: PAIN MEDICINE | Facility: CLINIC | Age: 59
End: 2024-05-27
Payer: COMMERCIAL

## 2024-05-27 VITALS
HEART RATE: 77 BPM | SYSTOLIC BLOOD PRESSURE: 161 MMHG | WEIGHT: 255.19 LBS | HEIGHT: 67 IN | BODY MASS INDEX: 40.05 KG/M2 | DIASTOLIC BLOOD PRESSURE: 92 MMHG

## 2024-05-27 DIAGNOSIS — G58.8 INTERCOSTAL NEURALGIA: Primary | ICD-10-CM

## 2024-05-27 PROCEDURE — 3044F HG A1C LEVEL LT 7.0%: CPT | Mod: CPTII,S$GLB,, | Performed by: NURSE PRACTITIONER

## 2024-05-27 PROCEDURE — 99999 PR PBB SHADOW E&M-EST. PATIENT-LVL III: CPT | Mod: PBBFAC,,, | Performed by: NURSE PRACTITIONER

## 2024-05-27 PROCEDURE — 99214 OFFICE O/P EST MOD 30 MIN: CPT | Mod: S$GLB,,, | Performed by: NURSE PRACTITIONER

## 2024-05-27 PROCEDURE — 3080F DIAST BP >= 90 MM HG: CPT | Mod: CPTII,S$GLB,, | Performed by: NURSE PRACTITIONER

## 2024-05-27 PROCEDURE — 3008F BODY MASS INDEX DOCD: CPT | Mod: CPTII,S$GLB,, | Performed by: NURSE PRACTITIONER

## 2024-05-27 PROCEDURE — 3077F SYST BP >= 140 MM HG: CPT | Mod: CPTII,S$GLB,, | Performed by: NURSE PRACTITIONER

## 2024-05-27 PROCEDURE — 1160F RVW MEDS BY RX/DR IN RCRD: CPT | Mod: CPTII,S$GLB,, | Performed by: NURSE PRACTITIONER

## 2024-05-27 PROCEDURE — 1159F MED LIST DOCD IN RCRD: CPT | Mod: CPTII,S$GLB,, | Performed by: NURSE PRACTITIONER

## 2024-05-27 PROCEDURE — 3072F LOW RISK FOR RETINOPATHY: CPT | Mod: CPTII,S$GLB,, | Performed by: NURSE PRACTITIONER

## 2024-05-28 ENCOUNTER — CLINICAL SUPPORT (OUTPATIENT)
Dept: REHABILITATION | Facility: HOSPITAL | Age: 59
End: 2024-05-28
Attending: EMERGENCY MEDICINE
Payer: COMMERCIAL

## 2024-05-28 DIAGNOSIS — G58.8 INTERCOSTAL NEURALGIA: Primary | ICD-10-CM

## 2024-05-28 DIAGNOSIS — M25.60 DECREASED MOBILITY OF JOINT: ICD-10-CM

## 2024-05-28 DIAGNOSIS — R29.3 POSTURAL IMBALANCE: ICD-10-CM

## 2024-05-28 PROCEDURE — 97112 NEUROMUSCULAR REEDUCATION: CPT

## 2024-05-28 PROCEDURE — 97140 MANUAL THERAPY 1/> REGIONS: CPT

## 2024-05-28 PROCEDURE — 97110 THERAPEUTIC EXERCISES: CPT

## 2024-05-28 NOTE — PROGRESS NOTES
Physical Therapy Daily Treatment Note     Name: Toño Ye  Clinic Number: [id number]    Therapy Diagnosis:   Encounter Diagnoses   Name Primary?    Intercostal neuralgia Yes    Decreased mobility of joint     Postural imbalance      Physician: Adonay Canales MD    Visit Date: 5/28/2024  Physician Orders: PT Eval and Treat   Medical Diagnosis from Referral: G58.8 (ICD-10-CM) - Intercostal neuralgia   Evaluation Date: 5/15/2024  Authorization Period Expiration: 4/26/2025  Plan of Care Expiration: 7/15/2024  Progress Note Due: 6/17/2024  Date of Surgery: None  Visit # / Visits authorized: 1/ 20   FOTO: 1/ 3    Time In: 11:00 am  Time Out: 11:55 am  Total Billable Time: 55 minutes  Precautions: Standard and Diabetes *Hypertension    Subjective   Pt reports: about two-days after his initial evaluation, the pain was gone. He has not had any trouble with sleeping and has had no trouble with any motions and he is surprised of the results so quickly. He continues to take gabapentin, but notes that he has cut down his dose significantly without increased pain, and has not even taken it yet today.  He was compliant with home exercise program.  Response to previous treatment: Initial evaluation  Functional change: Ongoing  Pain: 0/10  Objective     Thoracic AROM Pain/Dysfunction with movement   Flexion   Full range of motion  degrees No concordant pain   Extension   Slight limitation in range of motion  degrees Slight concordant pain   Right Side Bending   No asymmetry in range of motion  No concordant pain   Left Side Bending   No asymmetry in range of motion  Slight concordant pain   Right Rotation    No limitation in range of motion  degrees No concordant pain   Left Rotation Slight limitation in range of motion  degrees *No concordant pain      Toño received therapeutic exercises to develop strength, endurance, ROM, flexibility, and posture for 16 minutes including:  Open Books, sidelying (Home exercise  "program), 10x5" hold each side  Supine Thoracic Extension Self-Mobilizations with Foam Roller, 8x (Home exercise program)  Barclay Carries, 10#, 2 laps for 20 feet on right side     Toño received the following manual therapy techniques: Joint mobilizations were applied  for 8 minutes, including:  Supine Thoracic Grade V Manipulation  Objective measures taken (see above)      Toño participated in neuromuscular re-education activities to improve: Balance, Coordination, Kinesthetic, Sense, Proprioception, and Posture for 31 minutes. The following activities were included:  Prone Middle Trapezius, Level 1, 2x12, 5" hold  Bird Dogs, 12x each side, slow and controlled  Bridges with Latissimus Dorsi Isometric, 2x12, blue band  Paloff Press, 5#, 10x5" hold    Toño participated in dynamic functional therapeutic activities to improve functional performance for 0  minutes, includin    Home Exercises Provided and Patient Education Provided     Education provided:   - Home exercise program     Written Home Exercises Provided: yes.  Exercises were reviewed and Toño was able to demonstrate them prior to the end of the session.  Toño demonstrated good  understanding of the education provided.     See EMR under Patient Instructions for exercises provided prior visit.    Assessment   Toño is an incredibly pleasant gentleman, who endorses no pain upon arrival. During cardinal planes of range of motion, he does continue to exhibit limitation in thoracic extension due to thoracic kyphotic posture, and decreased range of motion into left rotation, no marked difference in side bending range of motion. This is improved with thoracic manipulation, and reinforced with muscular activation of erector spinae (multifidi), middle trapezius, and anti-rotation abdominal exercises which challenged Toño, but did not cause the patient any pain or discomfort. Due to continued administration of gabapentin, I advised Toño to follow up " with us in 2-3 weeks of independent home program to follow-through in his journey to decrease medication for symptom management to ensure therapy is working to improve his pain.    Toño Is progressing well towards his goals.   Pt prognosis is Good.     Pt will continue to benefit from skilled outpatient physical therapy to address the deficits listed in the problem list box on initial evaluation, provide pt/family education and to maximize pt's level of independence in the home and community environment.     Pt's spiritual, cultural and educational needs considered and pt agreeable to plan of care and goals.     Anticipated barriers to physical therapy: None    Goals: Goals:  Short Term Goals: 2 weeks   1.) Patient will demonstrate independence in compliance and technique of home exercise program provided as per teach-back method of assessment.  2.) Patient will demonstrate a 5% improvement as per FOTO score to demonstrate improvements in perceived functional ability.     Long Term Goals: 4 weeks   1.) Patient will demonstrate independence in compliance and technique of home exercise program provided as per teach-back method of assessment.  2.) Patient will demonstrate a 10% improvement as per FOTO score to demonstrate improvements in perceived functional ability.  3.) Toño will demonstrate no increase in pain with thoracic extension, left-sided rotation and sidebending.  Plan     Continue to progress as per plan of care.    Viviana Gutierrez, PT, DPT  Board Certified in Orthopedic Physical Therapy

## 2024-06-03 PROBLEM — Z00.00 ANNUAL PHYSICAL EXAM: Status: RESOLVED | Noted: 2021-10-11 | Resolved: 2024-06-03

## 2024-06-26 DIAGNOSIS — E11.9 TYPE 2 DIABETES MELLITUS WITHOUT COMPLICATION: ICD-10-CM

## 2024-06-28 ENCOUNTER — PATIENT MESSAGE (OUTPATIENT)
Dept: PAIN MEDICINE | Facility: CLINIC | Age: 59
End: 2024-06-28
Payer: COMMERCIAL

## 2024-08-03 DIAGNOSIS — F41.9 ANXIETY AND DEPRESSION: ICD-10-CM

## 2024-08-03 DIAGNOSIS — F32.A ANXIETY AND DEPRESSION: ICD-10-CM

## 2024-08-05 RX ORDER — BUPROPION HYDROCHLORIDE 150 MG/1
TABLET ORAL
Qty: 90 TABLET | Refills: 2 | Status: SHIPPED | OUTPATIENT
Start: 2024-08-05

## 2024-08-21 ENCOUNTER — PATIENT MESSAGE (OUTPATIENT)
Dept: ADMINISTRATIVE | Facility: HOSPITAL | Age: 59
End: 2024-08-21
Payer: COMMERCIAL

## 2024-08-23 ENCOUNTER — LAB VISIT (OUTPATIENT)
Dept: LAB | Facility: HOSPITAL | Age: 59
End: 2024-08-23
Attending: INTERNAL MEDICINE
Payer: COMMERCIAL

## 2024-08-23 ENCOUNTER — PATIENT MESSAGE (OUTPATIENT)
Dept: PRIMARY CARE CLINIC | Facility: CLINIC | Age: 59
End: 2024-08-23
Payer: COMMERCIAL

## 2024-08-23 DIAGNOSIS — E11.9 DIABETES MELLITUS WITHOUT COMPLICATION: Primary | ICD-10-CM

## 2024-08-23 DIAGNOSIS — E11.9 TYPE 2 DIABETES MELLITUS WITHOUT COMPLICATION: ICD-10-CM

## 2024-08-23 LAB
ALBUMIN/CREAT UR: 20.9 UG/MG (ref 0–30)
CREAT UR-MCNC: 196 MG/DL (ref 23–375)
MICROALBUMIN UR DL<=1MG/L-MCNC: 41 UG/ML

## 2024-08-23 PROCEDURE — 82570 ASSAY OF URINE CREATININE: CPT | Performed by: INTERNAL MEDICINE

## 2024-08-26 ENCOUNTER — LAB VISIT (OUTPATIENT)
Dept: LAB | Facility: HOSPITAL | Age: 59
End: 2024-08-26
Payer: COMMERCIAL

## 2024-08-26 DIAGNOSIS — E11.9 DIABETES MELLITUS WITHOUT COMPLICATION: ICD-10-CM

## 2024-08-26 LAB
ESTIMATED AVG GLUCOSE: 154 MG/DL (ref 68–131)
HBA1C MFR BLD: 7 % (ref 4–5.6)

## 2024-08-26 PROCEDURE — 83036 HEMOGLOBIN GLYCOSYLATED A1C: CPT | Performed by: INTERNAL MEDICINE

## 2024-08-26 PROCEDURE — 36415 COLL VENOUS BLD VENIPUNCTURE: CPT | Performed by: INTERNAL MEDICINE

## 2024-08-27 ENCOUNTER — OFFICE VISIT (OUTPATIENT)
Dept: PRIMARY CARE CLINIC | Facility: CLINIC | Age: 59
End: 2024-08-27
Payer: COMMERCIAL

## 2024-08-27 VITALS
RESPIRATION RATE: 18 BRPM | SYSTOLIC BLOOD PRESSURE: 124 MMHG | BODY MASS INDEX: 40.52 KG/M2 | DIASTOLIC BLOOD PRESSURE: 80 MMHG | WEIGHT: 258.19 LBS | HEART RATE: 74 BPM | HEIGHT: 67 IN | OXYGEN SATURATION: 96 % | TEMPERATURE: 96 F

## 2024-08-27 DIAGNOSIS — E11.9 DIABETES MELLITUS WITHOUT COMPLICATION: Primary | ICD-10-CM

## 2024-08-27 DIAGNOSIS — G47.33 OSA (OBSTRUCTIVE SLEEP APNEA): ICD-10-CM

## 2024-08-27 DIAGNOSIS — F41.9 ANXIETY AND DEPRESSION: ICD-10-CM

## 2024-08-27 DIAGNOSIS — F32.A ANXIETY AND DEPRESSION: ICD-10-CM

## 2024-08-27 DIAGNOSIS — E78.49 OTHER HYPERLIPIDEMIA: ICD-10-CM

## 2024-08-27 PROCEDURE — 1159F MED LIST DOCD IN RCRD: CPT | Mod: CPTII,S$GLB,, | Performed by: INTERNAL MEDICINE

## 2024-08-27 PROCEDURE — 3066F NEPHROPATHY DOC TX: CPT | Mod: CPTII,S$GLB,, | Performed by: INTERNAL MEDICINE

## 2024-08-27 PROCEDURE — 3079F DIAST BP 80-89 MM HG: CPT | Mod: CPTII,S$GLB,, | Performed by: INTERNAL MEDICINE

## 2024-08-27 PROCEDURE — 3051F HG A1C>EQUAL 7.0%<8.0%: CPT | Mod: CPTII,S$GLB,, | Performed by: INTERNAL MEDICINE

## 2024-08-27 PROCEDURE — 3074F SYST BP LT 130 MM HG: CPT | Mod: CPTII,S$GLB,, | Performed by: INTERNAL MEDICINE

## 2024-08-27 PROCEDURE — 3061F NEG MICROALBUMINURIA REV: CPT | Mod: CPTII,S$GLB,, | Performed by: INTERNAL MEDICINE

## 2024-08-27 PROCEDURE — 1160F RVW MEDS BY RX/DR IN RCRD: CPT | Mod: CPTII,S$GLB,, | Performed by: INTERNAL MEDICINE

## 2024-08-27 PROCEDURE — 99214 OFFICE O/P EST MOD 30 MIN: CPT | Mod: S$GLB,,, | Performed by: INTERNAL MEDICINE

## 2024-08-27 PROCEDURE — 3008F BODY MASS INDEX DOCD: CPT | Mod: CPTII,S$GLB,, | Performed by: INTERNAL MEDICINE

## 2024-08-27 PROCEDURE — 99999 PR PBB SHADOW E&M-EST. PATIENT-LVL IV: CPT | Mod: PBBFAC,,, | Performed by: INTERNAL MEDICINE

## 2024-08-27 PROCEDURE — 3072F LOW RISK FOR RETINOPATHY: CPT | Mod: CPTII,S$GLB,, | Performed by: INTERNAL MEDICINE

## 2024-08-27 RX ORDER — TRAZODONE HYDROCHLORIDE 50 MG/1
50 TABLET ORAL NIGHTLY
Qty: 30 TABLET | Refills: 5 | Status: SHIPPED | OUTPATIENT
Start: 2024-08-27 | End: 2025-08-27

## 2024-08-27 NOTE — PROGRESS NOTES
[facility] Note    Chief Complaint      Chief Complaint   Patient presents with    Follow-up     6m       History of Present Illness      Toño Ye is a 58 y.o. male who presents today for   Chief Complaint   Patient presents with    Follow-up     6m   .  Patient comes to appointment here for 6m checkup for chronic issues as below . He is feeling great . Would like to stop welbutrin xl . He is complaint with all meds . Admits he could be better with diet .    HPI    No problem-specific Assessment & Plan notes found for this encounter.       Problem List Items Addressed This Visit          Psychiatric    Anxiety and depression    Overview     He will stay on welbutrin at current dose every other day for 2 weeks then  stop            Cardiac/Vascular    Other hyperlipidemia    Overview     Cont lipitor 80 mg             Endocrine    Diabetes mellitus without complication - Primary    Overview     a1c 7 little higher admits to non complaince with diet . He will get strict againe cont gabapentin             Other    ISAIAH (obstructive sleep apnea)    Overview     Utilizing cpap with great benefit using allnight everynight              Past Medical History:  Past Medical History:   Diagnosis Date    Diabetes mellitus     Hyperlipidemia     Hypertension        Past Surgical History:  Past Surgical History:   Procedure Laterality Date    COLONOSCOPY  11/20/2017    COLONOSCOPY N/A 5/17/2023    Procedure: COLONOSCOPY;  Surgeon: Simone Lemus MD;  Location: [facility] ([address] [place] [address]);  Service: Colon and Rectal;  Laterality: N/A;  instr emailed/portal-GT    KNEE SURGERY         Family History:  family history includes Arthritis in his father and mother; Cancer in his father; Depression in his sister; Diabetes in his father; Drug abuse in his brother; Heart disease in his father; Hyperlipidemia in his father; Hypertension in his father and mother; Lung cancer in his father.     Social  History:  Social History     Socioeconomic History    Marital status:    Tobacco Use    Smoking status: Never    Smokeless tobacco: Never   Substance and Sexual Activity    Alcohol use: Yes     Alcohol/week: 4.0 standard drinks of alcohol     Types: 2 Glasses of wine, 2 Drinks containing 0.5 oz of alcohol per week    Drug use: Never    Sexual activity: Yes     Partners: Female     Birth control/protection: None       Review of Systems:   Review of Systems   Constitutional:  Negative for fever and weight loss.   HENT:  Negative for congestion, hearing loss and sore throat.    Eyes:  Negative for blurred vision.   Respiratory:  Negative for cough and shortness of breath.    Cardiovascular:  Negative for chest pain, palpitations, claudication and leg swelling.   Gastrointestinal:  Negative for abdominal pain, constipation, diarrhea and heartburn.   Genitourinary:  Negative for dysuria.   Musculoskeletal:  Negative for back pain and myalgias.   Skin:  Negative for rash.   Neurological:  Negative for focal weakness and headaches.   Psychiatric/Behavioral:  Negative for depression and suicidal ideas. The patient is not nervous/anxious.         Medications:  Outpatient Encounter Medications as of 8/27/2024   Medication Sig Dispense Refill    atorvastatin (LIPITOR) 80 MG tablet Take 1 tablet (80 mg total) by mouth once daily. 90 tablet 3    buPROPion (WELLBUTRIN XL) 150 MG TB24 tablet TAKE 1 TABLET BY MOUTH EVERY DAY 90 tablet 2    metFORMIN (GLUCOPHAGE-XR) 500 MG ER 24hr tablet TAKE 1 TABLET BY MOUTH EVERY DAY WITH BREAKFAST 90 tablet 3    nebivoloL (BYSTOLIC) 10 MG Tab Take 1 tablet (10 mg total) by mouth once daily. 90 tablet 3    traZODone (DESYREL) 50 MG tablet Take 1 tablet (50 mg total) by mouth every evening. 30 tablet 5    [DISCONTINUED] buPROPion (WELLBUTRIN XL) 150 MG TB24 tablet Take 1 tablet (150 mg total) by mouth once daily. 90 tablet 3    [DISCONTINUED] gabapentin (NEURONTIN) 300 MG capsule Take 1  "capsule (300 mg total) by mouth every evening for 7 days, THEN 1 capsule (300 mg total) 2 (two) times daily for 7 days, THEN 1 capsule (300 mg total) 3 (three) times daily for 16 days. (Patient not taking: Reported on 8/27/2024) 69 capsule 0    [DISCONTINUED] pantoprazole (PROTONIX) 40 MG tablet TAKE 1 TABLET BY MOUTH EVERY DAY 90 tablet 3     No facility-administered encounter medications on file as of 8/27/2024.       Allergies:  Review of patient's allergies indicates:  No Known Allergies      Physical Exam      Vitals:    08/27/24 1004   BP: 124/80   Pulse: 74   Resp: 18   Temp: 96 °F (35.6 °C)        Vital Signs  Temp: 96 °F (35.6 °C)  Temp Source: Oral  Pulse: 74  Resp: 18  SpO2: 96 %  BP: 124/80  BP Location: Right arm  Patient Position: Sitting  Pain Score: 0-No pain  Height and Weight  Height: 5' 6.5" (168.9 cm)  Weight: 117.1 kg (258 lb 2.5 oz)  BSA (Calculated - sq m): 2.34 sq meters  BMI (Calculated): 41  Weight in (lb) to have BMI = 25: 156.9]     Body mass index is 41.04 kg/m².    Physical Exam  Constitutional:       Appearance: He is well-developed.   HENT:      Head: Normocephalic.   Eyes:      Pupils: Pupils are equal, round, and reactive to light.   Neck:      Thyroid: No thyromegaly.   Cardiovascular:      Rate and Rhythm: Normal rate and regular rhythm.      Heart sounds: No murmur heard.     No friction rub. No gallop.   Pulmonary:      Effort: Pulmonary effort is normal.      Breath sounds: Normal breath sounds.   Abdominal:      General: Bowel sounds are normal.      Palpations: Abdomen is soft.   Musculoskeletal:         General: Normal range of motion.      Cervical back: Normal range of motion.   Skin:     General: Skin is warm and dry.   Neurological:      Mental Status: He is alert and oriented to person, place, and time.      Sensory: No sensory deficit.   Psychiatric:         Behavior: Behavior normal.          Laboratory:  CBC:  No results for input(s): "WBC", "RBC", "HGB", "HCT", " ""PLT", "MCV", "MCH", "MCHC" in the last 2160 hours.  CMP:  No results for input(s): "GLU", "CALCIUM", "ALBUMIN", "PROT", "NA", "K", "CO2", "CL", "BUN", "ALKPHOS", "ALT", "AST", "BILITOT" in the last 2160 hours.    Invalid input(s): "CREATININ"  URINALYSIS:  No results for input(s): "COLORU", "CLARITYU", "SPECGRAV", "PHUR", "PROTEINUA", "GLUCOSEU", "BILIRUBINCON", "BLOODU", "WBCU", "RBCU", "BACTERIA", "MUCUS", "NITRITE", "LEUKOCYTESUR", "UROBILINOGEN", "HYALINECASTS" in the last 2160 hours.   LIPIDS:  No results for input(s): "TSH", "HDL", "CHOL", "TRIG", "LDLCALC", "CHOLHDL", "NONHDLCHOL", "TOTALCHOLEST" in the last 2160 hours.  TSH:  No results for input(s): "TSH" in the last 2160 hours.  A1C:  Recent Labs   Lab Result Units 08/26/24  0754   Hemoglobin A1C % 7.0*       Radiology:        Assessment:     Toño Ye is a 58 y.o.male with:    Diabetes mellitus without complication    Anxiety and depression    ISAIAH (obstructive sleep apnea)    Other hyperlipidemia    Other orders  -     traZODone (DESYREL) 50 MG tablet; Take 1 tablet (50 mg total) by mouth every evening.  Dispense: 30 tablet; Refill: 5                Plan:     Problem List Items Addressed This Visit          Psychiatric    Anxiety and depression    Overview     He will stay on welbutrin at current dose every other day for 2 weeks then  stop            Cardiac/Vascular    Other hyperlipidemia    Overview     Cont lipitor 80 mg             Endocrine    Diabetes mellitus without complication - Primary    Overview     a1c 7 little higher admits to non complaince with diet . He will get strict againe cont gabapentin             Other    ISAIAH (obstructive sleep apnea)    Overview     Utilizing cpap with great benefit using allnight everynight             As above, continue current medications and maintain follow up with specialists.  Return to clinic in 6 months.      Frederick W Dantagnan [facility]

## 2024-12-17 DIAGNOSIS — E11.9 DIABETES MELLITUS WITHOUT COMPLICATION: ICD-10-CM

## 2024-12-17 RX ORDER — TRAZODONE HYDROCHLORIDE 50 MG/1
50 TABLET ORAL NIGHTLY
Qty: 90 TABLET | Refills: 2 | Status: SHIPPED | OUTPATIENT
Start: 2024-12-17

## 2024-12-17 RX ORDER — METFORMIN HYDROCHLORIDE 500 MG/1
500 TABLET, EXTENDED RELEASE ORAL
Qty: 90 TABLET | Refills: 0 | Status: SHIPPED | OUTPATIENT
Start: 2024-12-17

## 2024-12-17 NOTE — TELEPHONE ENCOUNTER
Refill Routing Note   Medication(s) are not appropriate for processing by [facility] for the following reason(s):        New or recently adjusted medication    [facility] action(s):  Approve   Requires labs : Yes             Appointments  past 12m or future 3m with PCP    Date Provider   Last Visit   8/27/2024 Peter Person MD   Next Visit   2/27/2025 Peter Person MD   ED visits in past 90 days: 0        Note composed:12:26 PM 12/17/2024

## 2024-12-17 NOTE — TELEPHONE ENCOUNTER
Care Due:                  Date            Visit Type   Department     Provider  --------------------------------------------------------------------------------                                EP -                              PRIMARY      [facility] PRIMARY  Last Visit: 08-      CARE ([facility])   KIM Person                              EP -                              PRIMARY      [facility] PRIMARY  Next Visit: 02-      CARE ([facility])   KIM Person                                                            Last  Test          Frequency    Reason                     Performed    Due Date  --------------------------------------------------------------------------------    Cr..........  12 months..  metFORMIN................  02- 02-    HBA1C.......  6 months...  metFORMIN................  08- 02-    [facility] Embedded Care Due Messages. Reference number: [id number].   12/17/2024 12:36:35 AM CST

## 2024-12-22 RX ORDER — FENOFIBRATE 160 MG/1
160 TABLET ORAL
Qty: 90 TABLET | Refills: 3 | OUTPATIENT
Start: 2024-12-22

## 2024-12-22 NOTE — TELEPHONE ENCOUNTER
No care due was identified.  [facility] Embedded Care Due Messages. Reference number: [id number].   12/22/2024 7:37:23 AM CST

## 2024-12-23 NOTE — TELEPHONE ENCOUNTER
Refill Decision Note   Toño Ye  is requesting a refill authorization.  Brief Assessment and Rationale for Refill:  Quick Discontinue     Medication Therapy Plan:  was discontinued on 3/12/2024 by Raquel Tapia MD.      Comments:     Note composed:6:20 PM 12/22/2024

## 2025-01-13 ENCOUNTER — OFFICE VISIT (OUTPATIENT)
Dept: SPINE | Facility: CLINIC | Age: 60
End: 2025-01-13
Payer: COMMERCIAL

## 2025-01-13 ENCOUNTER — HOSPITAL ENCOUNTER (OUTPATIENT)
Dept: RADIOLOGY | Facility: OTHER | Age: 60
Discharge: HOME | End: 2025-01-13
Attending: STUDENT IN AN ORGANIZED HEALTH CARE EDUCATION/TRAINING PROGRAM
Payer: COMMERCIAL

## 2025-01-13 VITALS
DIASTOLIC BLOOD PRESSURE: 81 MMHG | HEART RATE: 74 BPM | RESPIRATION RATE: 19 BRPM | OXYGEN SATURATION: 100 % | SYSTOLIC BLOOD PRESSURE: 171 MMHG | HEIGHT: 67 IN | WEIGHT: 258.19 LBS | BODY MASS INDEX: 40.52 KG/M2
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
## 2025-01-13 DIAGNOSIS — M53.3 COCCYODYNIA: Primary | ICD-10-CM

## 2025-01-13 DIAGNOSIS — S39.92XA TAILBONE INJURY, INITIAL ENCOUNTER: ICD-10-CM

## 2025-01-13 PROCEDURE — 3079F DIAST BP 80-89 MM HG: CPT | Mod: CPTII,S$GLB,, | Performed by: ANESTHESIOLOGY

## 2025-01-13 PROCEDURE — 99204 OFFICE O/P NEW MOD 45 MIN: CPT | Mod: S$GLB,,, | Performed by: ANESTHESIOLOGY

## 2025-01-13 PROCEDURE — 1159F MED LIST DOCD IN RCRD: CPT | Mod: CPTII,S$GLB,, | Performed by: ANESTHESIOLOGY

## 2025-01-13 PROCEDURE — 3077F SYST BP >= 140 MM HG: CPT | Mod: CPTII,S$GLB,, | Performed by: ANESTHESIOLOGY

## 2025-01-13 PROCEDURE — 72220 X-RAY EXAM SACRUM TAILBONE: CPT | Mod: TC,FY

## 2025-01-13 PROCEDURE — 3008F BODY MASS INDEX DOCD: CPT | Mod: CPTII,S$GLB,, | Performed by: ANESTHESIOLOGY

## 2025-01-13 PROCEDURE — 99999 PR PBB SHADOW E&M-EST. PATIENT-LVL IV: CPT | Mod: PBBFAC,,, | Performed by: ANESTHESIOLOGY

## 2025-01-13 PROCEDURE — 72220 X-RAY EXAM SACRUM TAILBONE: CPT | Mod: 26,,, | Performed by: RADIOLOGY

## 2025-01-13 NOTE — PROGRESS NOTES
PCP: Peter Person MD    REFERRING PHYSICIAN: [facility]    CHIEF COMPLAINT: tailbone pain     Original HISTORY OF PRESENT ILLNESS: Toño Ye presents to the clinic for the evaluation of the above pain. The pain started last Thursday 1/9/2025 onto his tailbone in the bathroom and fell backwards. He did have head strike without LOC. Patient did not get imaging or go to urgent care afterwards.     Original Pain Description:  The pain is located in the tailbone.lower sacrum and is axial in nature. The pain is described as  constant pressure and dull . Exacerbating factors: Bending, Walking, Getting out of bed/chair. Mitigating factors laying down and cushion which relieves pressure on tailbone. Symptoms interfere with daily activity and work. The patient feels like symptoms have been worsening. Patient denies night fever/night sweats, urinary incontinence, bowel incontinence, significant weight loss, significant motor weakness, and loss of sensations.    Original PAIN SCORES:  Best: Pain is 4  Worst: Pain is 8  Current: Pain is 6        5/27/2024     9:02 AM   Last 3 PDI Scores   Pain Disability Index (PDI) 0       INTERVAL HISTORY: (Newest visit at the bottom)   Interval History (Date):       6 weeks of Conservative therapy:  PT: No (Must include dates)  Chiro: No  HEP: No       Treatments / Medications: (Ice/Heat/NSAIDS/APAP/etc):  Tylenol 1000 mg TID  PRN - partial relief   Ibuprofen 250 mg TID PRN - partial relief   Ice pads- partial relief       Interventional Pain Procedures: (Previous injections)  Intercostal nerve block  (5/2024, Dr. Sullivan)     Past Medical History:   Diagnosis Date    Diabetes mellitus     Hyperlipidemia     Hypertension      Past Surgical History:   Procedure Laterality Date    COLONOSCOPY  11/20/2017    COLONOSCOPY N/A 5/17/2023    Procedure: COLONOSCOPY;  Surgeon: Simone Lemus MD;  Location: [facility] ([facility]);  Service: Colon and Rectal;   Laterality: N/A;  instr emailed/portal-GT    KNEE SURGERY       Social History     Socioeconomic History    Marital status:    Tobacco Use    Smoking status: Never    Smokeless tobacco: Never   Substance and Sexual Activity    Alcohol use: Yes     Alcohol/week: 4.0 standard drinks of alcohol     Types: 2 Glasses of wine, 2 Drinks containing 0.5 oz of alcohol per week    Drug use: Never    Sexual activity: Yes     Partners: Female     Birth control/protection: None     Family History   Problem Relation Name Age of Onset    Hypertension Mother Zuleika Ye     Arthritis Mother Zuleika Ye     Hypertension Father Juan Jose Ye     Lung cancer Father Juan Jose Ye     Cancer Father Juan Jose Ye         Lung Cancer    Diabetes Father Juan Jose Ye     Heart disease Father Juan Jose Ye         Irregular Heart Beat    Arthritis Father Juan Jose Ye     Hyperlipidemia Father Juan Jose Ye     Depression Sister Prabha Ye     Drug abuse Brother Guanako Ye        Review of patient's allergies indicates:  No Known Allergies    Current Outpatient Medications   Medication Sig    atorvastatin (LIPITOR) 80 MG tablet Take 1 tablet (80 mg total) by mouth once daily.    metFORMIN (GLUCOPHAGE-XR) 500 MG ER 24hr tablet TAKE 1 TABLET BY MOUTH EVERY DAY WITH BREAKFAST    nebivoloL (BYSTOLIC) 10 MG Tab Take 1 tablet (10 mg total) by mouth once daily.    traZODone (DESYREL) 50 MG tablet TAKE 1 TABLET BY MOUTH EVERY EVENING.    buPROPion (WELLBUTRIN XL) 150 MG TB24 tablet TAKE 1 TABLET BY MOUTH EVERY DAY     No current facility-administered medications for this visit.       ROS:  GENERAL: No fever. No chills. No fatigue. Denies weight loss. Denies weight gain.  HEENT: Denies headaches. Denies vision change. Denies eye pain. Denies double vision. Denies ear pain.   CV: Denies chest pain.   PULM: Denies of shortness of breath.  GI: Denies constipation. No diarrhea. No abdominal  "pain. Denies nausea. Denies vomiting. No blood in stool.  HEME: Denies bleeding problems.  : Denies urgency. No painful urination. No blood in urine.  MS: Denies joint stiffness. Denies joint swelling.  Reports tailbone pain.  SKIN: Denies rash.   NEURO: Denies seizures. No weakness.  PSYCH:  Denies difficulty sleeping. No anxiety. Denies depression. No suicidal thoughts.       VITALS:   Vitals:    01/13/25 1052   BP: (!) 171/81   Pulse: 74   Resp: 19   SpO2: 100%   Weight: 117.1 kg (258 lb 2.5 oz)   Height: 5' 6.5" (1.689 m)   PainSc:   6   PainLoc: Back         PHYSICAL EXAM:   GENERAL: Well appearing, in no acute distress, alert and oriented x3.  PSYCH:  Mood and affect appropriate.  SKIN: Skin color, texture, turgor normal, no rashes or lesions.  HEENT:  Normocephalic, atraumatic. Cranial nerves grossly intact.  NECK: No pain to palpation over the cervical paraspinous muscles. No pain to palpation over facets. No pain with neck flexion, extension, or lateral flexion.   PULM: No evidence of respiratory difficulty, symmetric chest rise.  GI:  Non-distended  BACK: Normal range of motion. No pain to palpation over the spinous processes. No pain to palpation over facet joints. There is no pain with palpation over the sacroiliac joints bilaterally. Pain to palpation over coccyx     EXTREMITIES: No deformities, edema, or skin discoloration.   MUSCULOSKELETAL: Shoulder, hip, and knee provocative maneuvers are negative. No atrophy is noted.  NEURO: Sensation is equal and appropriate bilaterally. Bilateral upper and lower extremity strength is normal and symmetric. Bilateral upper and lower extremity coordination and muscle stretch reflexes are physiologic and symmetric. Plantar response are downgoing. Straight leg raising in the supine position is negative to radicular pain.   GAIT: normal.      LABS: N/A     IMAGING:  No relevant imaging     ASSESSMENT: 59 y.o. year old male with pain, consistent with:    Encounter " Diagnoses   Name Primary?    Tailbone injury, initial encounter Yes    Coccyodynia        DISCUSSION: Toño Ye is a 59 year old male patient who comes to us with midline coccygeal pain after a fall in the shower. The pain is worst with sitting and leaning back. There is no imaging on file. Exam shows pain reproduced with palpation over coccyx .      PLAN:  Order X RAY Sacrum/Coccyx   Referral placed for Physical Therapy   May continue ibuprofen/tylenol PRN   Advised patient to try heat packs as needed for pain control   Advised patient he may continue with Donut Cushion to offload weight off coccyx   RTC in 4-6 weeks       I would like to thank [facility] for the opportunity to assist in the care of this patient. We had a very nice visit and I look forward to continuing their care. Please let me know if I can be of further assistance.     Marco Almaraz  01/13/2025

## 2025-01-18 NOTE — TELEPHONE ENCOUNTER
No care due was identified.  [facility] Embedded Care Due Messages. Reference number: [id number].   1/18/2025 7:09:19 AM CST

## 2025-01-19 NOTE — TELEPHONE ENCOUNTER
Refill Routing Note   Medication(s) are not appropriate for processing by [facility] for the following reason(s):        Required vitals abnormal    [facility] action(s):  Defer               Appointments  past 12m or future 3m with PCP    Date Provider   Last Visit   8/27/2024 Peter Person MD   Next Visit   2/27/2025 Peter Person MD   ED visits in past 90 days: 0        Note composed:5:39 PM 01/19/2025

## 2025-01-20 RX ORDER — NEBIVOLOL 10 MG/1
10 TABLET ORAL
Qty: 90 TABLET | Refills: 3 | Status: SHIPPED | OUTPATIENT
Start: 2025-01-20

## 2025-01-20 RX ORDER — NEBIVOLOL 10 MG/1
10 TABLET ORAL DAILY
Qty: 90 TABLET | Refills: 3 | Status: SHIPPED | OUTPATIENT
Start: 2025-01-20 | End: 2026-01-20

## 2025-01-20 NOTE — TELEPHONE ENCOUNTER
No care due was identified.  [facility] Embedded Care Due Messages. Reference number: [id number].   1/20/2025 7:10:58 AM CST

## 2025-01-23 ENCOUNTER — OFFICE VISIT (OUTPATIENT)
Dept: SPINE | Facility: CLINIC | Age: 60
End: 2025-01-23
Payer: COMMERCIAL

## 2025-01-23 DIAGNOSIS — M53.3 COCCYODYNIA: Primary | ICD-10-CM

## 2025-01-23 DIAGNOSIS — M54.50 ACUTE BILATERAL LOW BACK PAIN WITHOUT SCIATICA: ICD-10-CM
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
## 2025-01-23 PROCEDURE — 98006 SYNCH AUDIO-VIDEO EST MOD 30: CPT | Mod: 95,,, | Performed by: ANESTHESIOLOGY

## 2025-01-23 RX ORDER — CYCLOBENZAPRINE HCL 5 MG
5 TABLET ORAL NIGHTLY PRN
Qty: 30 TABLET | Refills: 0 | Status: SHIPPED | OUTPATIENT
Start: 2025-01-23

## 2025-01-23 NOTE — PROGRESS NOTES
Chronic Pain-Tele-Medicine-Established Note (Follow up visit)        The patient location is: home   The chief complaint leading to consultation is: tailbone pain  Visit type: Virtual visit with synchronous audio and video  Each patient to whom he or she provides medical services by telemedicine is:  (1) informed of the relationship between the physician and patient and the respective role of any other health care provider with respect to management of the patient; and (2) notified that he or she may decline to receive medical services by telemedicine and may withdraw from such care at any time.  PCP: Peter Person MD    REFERRING PHYSICIAN: No ref. provider found    CHIEF COMPLAINT: tailbone pain     Original HISTORY OF PRESENT ILLNESS: Toño Ye presents to the clinic for the evaluation of the above pain. The pain started last Thursday 1/9/2025 onto his tailbone in the bathroom and fell backwards. He did have head strike without LOC. Patient did not get imaging or go to urgent care afterwards.     Original Pain Description:  The pain is located in the tailbone.lower sacrum and is axial in nature. The pain is described as  constant pressure and dull . Exacerbating factors: Bending, Walking, Getting out of bed/chair. Mitigating factors laying down and cushion which relieves pressure on tailbone. Symptoms interfere with daily activity and work. The patient feels like symptoms have been worsening. Patient denies night fever/night sweats, urinary incontinence, bowel incontinence, significant weight loss, significant motor weakness, and loss of sensations.    Original PAIN SCORES:  Best: Pain is 4  Worst: Pain is 8  Current: Pain is 6        5/27/2024     9:02 AM   Last 3 PDI Scores   Pain Disability Index (PDI) 0       INTERVAL HISTORY: (Newest visit at the bottom)   Interval History (1/23/2025): Virtual Visit: Mr. Ye follows up after initial presentation for tailbone pain after a fall.  He states he still has ongoing tailbone pain that is well controlled with ibuprofen and tylenol. Pain has improved from last visit. He states that he is developing new onset tightness in his lower back that radiates across and will go down to bilateral buttocks as well. The tightness is worst in morning, while sleeping and at end of of the night. He feels pressure in his low back, more than the pain. He reports he has gotten donut cushion which he always uses while sitting down at home and at office and is doing a lot of work standing. Reports he has tried Voltaren gel as well. Currently, pain is 5/10. Reports sitting down exacerbates his pain more. Overall, however, he feels improved.     6 weeks of Conservative therapy:  PT: No (Must include dates)  Chiro: No  HEP: No       Treatments / Medications: (Ice/Heat/NSAIDS/APAP/etc):  Tylenol 1000 mg TID  PRN - partial relief   Ibuprofen 250 mg TID PRN - partial relief   Ice pads- partial relief       Interventional Pain Procedures: (Previous injections)  Intercostal nerve block  (5/2024, Dr. Sullivan)     Past Medical History:   Diagnosis Date    Diabetes mellitus     Hyperlipidemia     Hypertension      Past Surgical History:   Procedure Laterality Date    COLONOSCOPY  11/20/2017    COLONOSCOPY N/A 5/17/2023    Procedure: COLONOSCOPY;  Surgeon: Simone Lemus MD;  Location: [facility] ([address] [place] [address]);  Service: Colon and Rectal;  Laterality: N/A;  instr emailed/portal-GT    KNEE SURGERY       Social History     Socioeconomic History    Marital status:    Tobacco Use    Smoking status: Never    Smokeless tobacco: Never   Substance and Sexual Activity    Alcohol use: Yes     Alcohol/week: 4.0 standard drinks of alcohol     Types: 2 Glasses of wine, 2 Drinks containing 0.5 oz of alcohol per week    Drug use: Never    Sexual activity: Yes     Partners: Female     Birth control/protection: None     Family History   Problem Relation Name Age of Onset    Hypertension Mother  Zuleika Ye     Arthritis Mother Zuleika Penalozarez     Hypertension Father Juan Jose Ye     Lung cancer Father Juan Jose Ye     Cancer Father Juan Jose Ye         Lung Cancer    Diabetes Father Juan Jose Ye     Heart disease Father Juan Jose Penalozarez         Irregular Heart Beat    Arthritis Father Juan Jose Ye     Hyperlipidemia Father Juan Jose Penalozarez     Depression Sister Prabha Ye     Drug abuse Brother Guanako Ye        Review of patient's allergies indicates:  No Known Allergies    Current Outpatient Medications   Medication Sig    atorvastatin (LIPITOR) 80 MG tablet Take 1 tablet (80 mg total) by mouth once daily.    metFORMIN (GLUCOPHAGE-XR) 500 MG ER 24hr tablet TAKE 1 TABLET BY MOUTH EVERY DAY WITH BREAKFAST    nebivoloL (BYSTOLIC) 10 MG Tab TAKE 1 TABLET BY MOUTH EVERY DAY    nebivoloL (BYSTOLIC) 10 MG Tab Take 1 tablet (10 mg total) by mouth once daily.    traZODone (DESYREL) 50 MG tablet TAKE 1 TABLET BY MOUTH EVERY EVENING.     No current facility-administered medications for this visit.       ROS:  GENERAL: No fever. No chills. No fatigue. Denies weight loss. Denies weight gain.  HEENT: Denies headaches. Denies vision change. Denies eye pain. Denies double vision. Denies ear pain.   CV: Denies chest pain.   PULM: Denies of shortness of breath.  GI: Denies constipation. No diarrhea. No abdominal pain. Denies nausea. Denies vomiting. No blood in stool.  HEME: Denies bleeding problems.  : Denies urgency. No painful urination. No blood in urine.  MS: Denies joint stiffness. Denies joint swelling.  Reports tailbone pain.  SKIN: Denies rash.   NEURO: Denies seizures. No weakness.  PSYCH:  Denies difficulty sleeping. No anxiety. Denies depression. No suicidal thoughts.       VITALS:   There were no vitals filed for this visit.        PHYSICAL EXAM:   GENERAL: Well appearing, in no acute distress, alert and oriented x3.  PSYCH:  Mood and affect  appropriate.  SKIN: Skin color, texture, turgor normal, no rashes or lesions.      LABS: N/A     IMAGING:  X Ray Sacrum/Coccyx  CLINICAL HISTORY:  Unspecified injury of lower back, initial encounter     TECHNIQUE:  Two or three views of the sacrum and coccyx were performed.     COMPARISON:  None     FINDINGS:  Dorsal subluxation, near dislocation, approximately the 2nd coccygeal segment relative to the 1st.  There is likely an underlying fracture, though this is radiographically occult.     No suspicious sclerosis along the SI joints.  SI joints appear fairly symmetric.     Impression:     Abnormal study as above.       ASSESSMENT: 59 y.o. year old male with pain, consistent with:    No diagnosis found.      DISCUSSION: Toño Ye is a 59 year old male patient who comes to us with midline coccygeal pain after a fall in the shower. The pain is worst with sitting and leaning back. Imaging revealed dorsal subluxation of the distal coccyx. Exam shows pain reproduced with palpation over coccyx. Fortunately, he is improving with conservative care.       PLAN:  Reviewed X-Ray Sacrum/Coccyx and discussed results of the imaging.   ORDER PLACED for new X Ray of Lumbar Spine  ORDER PLACED for cyclobenzaprine 5 mg QHS PRN   Encouraged patient to start Physical Therapy (order already in system)  May continue ibuprofen/tylenol PRN   May continue  heat packs as needed for pain control   Continue with Donut Cushion to offload weight off coccyx   RTC in 4-6 weeks       Marco Almaraz  01/23/2025

## 2025-01-24 ENCOUNTER — HOSPITAL ENCOUNTER (OUTPATIENT)
Dept: RADIOLOGY | Facility: HOSPITAL | Age: 60
Discharge: HOME | End: 2025-01-24
Attending: STUDENT IN AN ORGANIZED HEALTH CARE EDUCATION/TRAINING PROGRAM
Payer: COMMERCIAL

## 2025-01-24 DIAGNOSIS — M54.50 ACUTE BILATERAL LOW BACK PAIN WITHOUT SCIATICA: ICD-10-CM

## 2025-01-24 PROCEDURE — 72100 X-RAY EXAM L-S SPINE 2/3 VWS: CPT | Mod: TC

## 2025-01-24 PROCEDURE — 72100 X-RAY EXAM L-S SPINE 2/3 VWS: CPT | Mod: 26,,, | Performed by: INTERNAL MEDICINE

## 2025-01-28 ENCOUNTER — CLINICAL SUPPORT (OUTPATIENT)
Dept: REHABILITATION | Facility: HOSPITAL | Age: 60
End: 2025-01-28
Attending: ANESTHESIOLOGY
Payer: COMMERCIAL

## 2025-01-28 DIAGNOSIS — M54.50 ACUTE BILATERAL LOW BACK PAIN WITHOUT SCIATICA: ICD-10-CM

## 2025-01-28 DIAGNOSIS — M53.3 COCCYODYNIA: ICD-10-CM

## 2025-01-28 PROCEDURE — 97161 PT EVAL LOW COMPLEX 20 MIN: CPT

## 2025-01-28 PROCEDURE — 97530 THERAPEUTIC ACTIVITIES: CPT

## 2025-01-28 NOTE — PROGRESS NOTES
Outpatient Rehab    Physical Therapy Evaluation    Patient Name: Toño Ye  MRN: [id number]  YOB: 1965  Today's Date: 1/28/2025    Therapy Diagnosis:   Encounter Diagnoses   Name Primary?    Acute bilateral low back pain without sciatica     Coccyodynia      Physician: Radha Monique MD    Physician Orders: Eval and Treat  Medical Diagnosis:  Acute bilateral low back pain without sciatica [M54.50], Coccyodynia [M53.3]     Visit # / Visits Authorized:  1 / 1   Date of Evaluation:  1/28/2025   Insurance Authorization Period: 1/23/2025 to 12/31/2025  Plan of Care Certification:  1/28/2025 to 3/28/2025      Time In: 1105   Time Out: 1155  Total Time: 50   Total Billable Time: 50         Subjective   History of Present Illness  Toño is a 59 y.o. male who reports to physical therapy with a chief concern of difficulty with sitting and forward flexion/extension in standing. According to the patient's chart, Toño has a past medical history of Diabetes mellitus, Hyperlipidemia, and Hypertension. Toño has a past surgical history that includes Knee surgery; Colonoscopy (11/20/2017); and Colonoscopy (N/A, 5/17/2023).        Diagnostic tests related to this condition: X-ray.   X-Ray Details: FINDINGS: The vertebral bodies are normal in height and alignment. Mild osteophytic spurring is present along vertebral endplates. Facet degenerative change in the lower spine. There is vascular calcification within the soft tissue.  Impression:  As above          Tailbone/Sacrum on 1/13/2025   FINDINGS: Dorsal subluxation, near dislocation, approximately the 2nd coccygeal segment relative to the 1st. There is likely an underlying fracture, though this is radiographically occult.  No suspicious sclerosis along the SI joints. SI joints appear fairly symmetric.  Impression:  Abnormal study as above.    History of Present Condition/Illness: Patient states he had a fall in his bathroom on 1/9/2025 (almost 3  weeks ago) and hit his tailbone and his head but without injury to his head or LOC. Patient states tail bone pain has persisted and he has maintained pain with sitting so he tries to stand more often as this gives him better relief. He also states he has a seat cushion for when he does sit down and makes it slightly better. Patient denies lower extremity numbness/tingling and states he also has felt some bloating/gastrointestinal sensations in the past week, but notes he is still having regular and normal bowel movements. He states he has been patient with his healing time frames and is not trying to push too much, but has noticed his lower back starting to get tighter. Has previously done PT in the past for his thoracic spine (May of last year) and was successful with this. Patient states he has been doing a lot of standing in the past few weeks and pain has not worsened since then.     Review of Systems  Patient denies: Bladder Incontinence, Bowel Incontinence, and Lower Extremity Neurological Deficits        Treatment History  Treatments  Discharged From Past 30 Days: Outpatient therapy  Previously Received Treatments: Yes  Previous Treatments: Exercise, Home exercise program      Past Medical History/Physical Systems Review:   Toño Ye  has a past medical history of Diabetes mellitus, Hyperlipidemia, and Hypertension.    Toño Ye  has a past surgical history that includes Knee surgery; Colonoscopy (11/20/2017); and Colonoscopy (N/A, 5/17/2023).    Toño has a current medication list which includes the following prescription(s): atorvastatin, cyclobenzaprine, metformin, nebivolol, nebivolol, and trazodone.    Review of patient's allergies indicates:  No Known Allergies     Objective      Lower Extremity Sensation  General Lumbar/Lower Extremity Sensation  Intact: Right and Left  Right Lumbar/Lower Extremity Sensation  Intact: Light Touch, Sharp/Dull Discrimination, Static Two  Point Discrimination, Dynamic Two Point Discrimination, Kinesthesia, and Proprioception  Right Lumbar/Lower Extremity Sensation Stocking Glove Pattern: No    Left Lumbar/Lower Extremity Sensation  Intact: Light Touch, Static Two Point Discrimination, Dynamic Two Point Discrimination, Sharp/Dull Discrimination, Kinesthesia, and Proprioception  Left Lumbar/Lower Extremity Sensation Stocking Glove Pattern: No             Spinal Mobility  Normal: Lumbosacral  Lumbosacral Mobility Details: Normal mobility of L1-L4, increased mobility of L4/L5, Guarded assessment/empty end-feel of Sacral region due to pain        Lumbar Range of Motion   Active (deg) Passive (deg) Pain   Flexion 50   Yes   Extension 10   Yes   Right Lateral Flexion 25       Right Rotation 10       Left Lateral Flexion 25       Left Rotation 10                Hip Range of Motion   Right Hip   Active (deg) Passive (deg) Pain   Flexion 70       Extension 5 10 Yes   ABduction         ADduction         External Rotation 90/90         External Rotation Prone         Internal Rotation 90/90         Internal Rotation Prone             Left Hip   Active (deg) Passive (deg) Pain   Flexion 70       Extension 0 5 Yes   ABduction         ADduction         External Rotation 90/90         External Rotation Prone         Internal Rotation 90/90         Internal Rotation Prone                            Hip Strength - Planes of Motion   Right Strength Right Pain Left Strength Left  Pain   Flexion (L2) 4   4     Extension 4- Yes 3+ Yes   ABduction 3-   3-     ADduction           Internal Rotation           External Rotation               Hip Strength - Isolated Muscles   Right Strength Right Pain Left Strength Left  Pain   Gluteus Darrel 4- Yes 3+ Yes   Gluteus Medius 3-   3-     Gluteus Minimus           Tensor Fasciae Latae                    Four Stage Balance Test                 Single Leg Stand - Right Foot: 10 sec  Single Leg Stand - Left Foot: 10 sec        Gait  Analysis  Gait Pattern: Antalgic            Hip Observations During Gait  Bilateral: Decreased Hip Extension and Hip Trendelenburg         Intake Outcome Measure for FOTO Survey    Therapist reviewed FOTO scores for Toño Ye on 1/28/2025.   FOTO report - see Media section or FOTO account episode details.     Intake Score: 42%    Treatment:  Therapeutic Activity  Therapeutic Activity 1: HEP: Hip abduction standing RTB 2 x 10; clamshells sidelying GTB, 2 x 10; Prone hip extension glute max holds isometric, 10 x 10 sec holds; sidelying thoracic rotation 2 x 10    Patient's spiritual, cultural, and educational needs considered and patient agreeable to plan of care and goals.     Assessment & Plan   Assessment  Toño presents with a condition of Low complexity.   Presentation of Symptoms: Stable       Functional Limitations: Activity tolerance, Driving, Pain with ADLs/IADLs, Squatting    Patient Goal for Therapy (PT): improve outcomes following recovery for tailbone fracture  Prognosis: Good  Assessment Details: Patient presents with decreased range of motion of lumbar spine and decreased lower extremity strength following tailbone fracture after a fall 3 weeks ago. He has imaging confirming a dorsal subluxation of the 2nd coccygeal segment and has been able to better tolerate standing and walking as opposed to sitting and flexion/extension motions of his lumbar spine. Patient shows no signs of lower extremity paresthesias or neurological changes since this injury. He shows good tolerance to home exercises and has some decreased motor firing pattern with coordinating glute muscles. Patient will benefit from skilled therapy in order to return to prior level of function and better tolerate ADLs.     Plan  From a physical therapy perspective, the patient would benefit from: Skilled Rehab Services    Planned therapy interventions include: Therapeutic exercise, Therapeutic activities, Neuromuscular  re-education, and Manual therapy.    Planned modalities to include: Electrical stimulation - passive/unattended and Electrical stimulation - attended.        Visit Frequency: 2 times Per Week for 8 Weeks.       This plan was discussed with Patient.   Discussion participants: Agreed Upon Plan of Care  Plan details: Improve thoracic spine mobility, glute coordination, and protect sacrum/coccygeal fracture healing           Goals:   Active       Physical Therapy       Physical Therapy Goal (Progressing)       Start:  01/28/25    Expected End:  03/28/25       GOALS: Short Term Goals:  4 weeks  1.Report decreased low back/sacrum pain  < / =  2/10 to increase tolerance for progressing exercises in therapy.   2. Increase lumbar flexion and extension ROM by 5-10 degrees where limited in order to perform ADLs without difficulty.  3. Increase strength by 1/3 MMT grade in bilateral glute max and glute med to increase tolerance for ADL and work activities.  4. Pt to tolerate HEP to improve ROM and independence with ADL's    Long Term Goals: 8 weeks  1.Report decreased Low back/Sacrum pain < / = 1/10 to increase tolerance for progressing walking and squatting tolerance.  2.Patient goal: be able to better tolerate flexion and extension motions as well as improve squatting/sitting tolerance without tailbone pain.   3.Increase strength to 4+/5 in Bilateral glute max and glute med to increase tolerance for ADL and work activities.  4. Pt will report at >/= 67% on FOTO  to demonstrate increase in LE function with every day tasks.

## 2025-02-24 ENCOUNTER — OFFICE VISIT (OUTPATIENT)
Dept: SPINE | Facility: CLINIC | Age: 60
End: 2025-02-24
Payer: COMMERCIAL

## 2025-02-24 VITALS
HEART RATE: 81 BPM | DIASTOLIC BLOOD PRESSURE: 77 MMHG | HEIGHT: 67 IN | SYSTOLIC BLOOD PRESSURE: 155 MMHG | WEIGHT: 258.19 LBS | OXYGEN SATURATION: 100 % | BODY MASS INDEX: 40.52 KG/M2 | RESPIRATION RATE: 19 BRPM

## 2025-02-24 DIAGNOSIS — M53.3 COCCYODYNIA: Primary | ICD-10-CM

## 2025-02-24 PROCEDURE — 3008F BODY MASS INDEX DOCD: CPT | Mod: CPTII,S$GLB,, | Performed by: ANESTHESIOLOGY

## 2025-02-24 PROCEDURE — 3077F SYST BP >= 140 MM HG: CPT | Mod: CPTII,S$GLB,, | Performed by: ANESTHESIOLOGY

## 2025-02-24 PROCEDURE — 1159F MED LIST DOCD IN RCRD: CPT | Mod: CPTII,S$GLB,, | Performed by: ANESTHESIOLOGY

## 2025-02-24 PROCEDURE — 99213 OFFICE O/P EST LOW 20 MIN: CPT | Mod: S$GLB,,, | Performed by: ANESTHESIOLOGY

## 2025-02-24 PROCEDURE — 3078F DIAST BP <80 MM HG: CPT | Mod: CPTII,S$GLB,, | Performed by: ANESTHESIOLOGY

## 2025-02-24 PROCEDURE — 99999 PR PBB SHADOW E&M-EST. PATIENT-LVL III: CPT | Mod: PBBFAC,,, | Performed by: ANESTHESIOLOGY

## 2025-02-24 NOTE — PROGRESS NOTES
Chronic Pain-Established Note (Follow up visit)        REFERRING PHYSICIAN: No ref. provider found    CHIEF COMPLAINT: tailbone pain     Original HISTORY OF PRESENT ILLNESS: Toño Ye presents to the clinic for the evaluation of the above pain. The pain started last Thursday 1/9/2025 onto his tailbone in the bathroom and fell backwards. He did have head strike without LOC. Patient did not get imaging or go to urgent care afterwards.     Original Pain Description:  The pain is located in the tailbone.lower sacrum and is axial in nature. The pain is described as  constant pressure and dull . Exacerbating factors: Bending, Walking, Getting out of bed/chair. Mitigating factors laying down and cushion which relieves pressure on tailbone. Symptoms interfere with daily activity and work. The patient feels like symptoms have been worsening. Patient denies night fever/night sweats, urinary incontinence, bowel incontinence, significant weight loss, significant motor weakness, and loss of sensations.    Original PAIN SCORES:  Best: Pain is 4  Worst: Pain is 8  Current: Pain is 6        5/27/2024     9:02 AM   Last 3 PDI Scores   Pain Disability Index (PDI) 0       INTERVAL HISTORY: (Newest visit at the bottom)   Interval History (1/23/2025): Virtual Visit: Mr. Ye follows up after initial presentation for tailbone pain after a fall. He states he still has ongoing tailbone pain that is well controlled with ibuprofen and tylenol. Pain has improved from last visit. He states that he is developing new onset tightness in his lower back that radiates across and will go down to bilateral buttocks as well. The tightness is worst in morning, while sleeping and at end of of the night. He feels pressure in his low back, more than the pain. He reports he has gotten donut cushion which he always uses while sitting down at home and at office and is doing a lot of work standing. Reports he has tried Voltaren gel as well.  "Currently, pain is 5/10. Reports sitting down exacerbates his pain more. Overall, however, he feels improved.     Interval History 2/24/25: Toño Ye returns for follow up. At our last visit he presented with tailbone pain after a fall. Imaging did show dislocation of the tailbone, but he was improving with conservative measures. Today, he reports that his pain has diminished and is now only a constant "pressure." He continues to be active, is cutting the grass, but is being very careful. He has discontinued flexeril at this time. He was taking ibuprofen/APAP daily last visit, but is now only taking it at bedtime. He does continue to sit on a cushion or use a stand-up desk.     6 weeks of Conservative therapy:  PT: No (Must include dates)  Chiro: No  HEP: No       Treatments / Medications: (Ice/Heat/NSAIDS/APAP/etc):  Tylenol 1000 mg TID  PRN - partial relief   Ibuprofen 250 mg TID PRN - partial relief   Ice pads- partial relief       Interventional Pain Procedures: (Previous injections)  Intercostal nerve block  (5/2024, Dr. Sullivan)     Past Medical History:   Diagnosis Date    Diabetes mellitus     Hyperlipidemia     Hypertension      Past Surgical History:   Procedure Laterality Date    COLONOSCOPY  11/20/2017    COLONOSCOPY N/A 5/17/2023    Procedure: COLONOSCOPY;  Surgeon: Simone Lemus MD;  Location: [facility] ([address] [place] [address]);  Service: Colon and Rectal;  Laterality: N/A;  instr emailed/portal-GT    KNEE SURGERY       Social History     Socioeconomic History    Marital status:    Tobacco Use    Smoking status: Never    Smokeless tobacco: Never   Substance and Sexual Activity    Alcohol use: Yes     Alcohol/week: 4.0 standard drinks of alcohol     Types: 2 Glasses of wine, 2 Drinks containing 0.5 oz of alcohol per week    Drug use: Never    Sexual activity: Yes     Partners: Female     Birth control/protection: None     Family History   Problem Relation Name Age of Onset    Hypertension " Mother Zuleika Ye     Arthritis Mother Zuleika Ye     Hypertension Father Juan Jose Ye     Lung cancer Father Juan Jose Ye     Cancer Father Juan Jose Ye         Lung Cancer    Diabetes Father Juan Jose Ye     Heart disease Father Juan Jose Ye         Irregular Heart Beat    Arthritis Father Juan Jose Ye     Hyperlipidemia Father Juan Jose Ye     Depression Sister Prabha Ye     Drug abuse Brother Guanako Ye        Review of patient's allergies indicates:  No Known Allergies    Current Outpatient Medications   Medication Sig    atorvastatin (LIPITOR) 80 MG tablet Take 1 tablet (80 mg total) by mouth once daily.    cyclobenzaprine (FLEXERIL) 5 MG tablet Take 1 tablet (5 mg total) by mouth nightly as needed for Muscle spasms.    metFORMIN (GLUCOPHAGE-XR) 500 MG ER 24hr tablet TAKE 1 TABLET BY MOUTH EVERY DAY WITH BREAKFAST    nebivoloL (BYSTOLIC) 10 MG Tab TAKE 1 TABLET BY MOUTH EVERY DAY    nebivoloL (BYSTOLIC) 10 MG Tab Take 1 tablet (10 mg total) by mouth once daily.    traZODone (DESYREL) 50 MG tablet TAKE 1 TABLET BY MOUTH EVERY EVENING.     No current facility-administered medications for this visit.       ROS:  GENERAL: No fever. No chills. No fatigue. Denies weight loss. Denies weight gain.  HEENT: Denies headaches. Denies vision change. Denies eye pain. Denies double vision. Denies ear pain.   CV: Denies chest pain.   PULM: Denies of shortness of breath.  GI: Denies constipation. No diarrhea. No abdominal pain. Denies nausea. Denies vomiting. No blood in stool.  HEME: Denies bleeding problems.  : Denies urgency. No painful urination. No blood in urine.  MS: Denies joint stiffness. Denies joint swelling.  Reports tailbone pain.  SKIN: Denies rash.   NEURO: Denies seizures. No weakness.  PSYCH:  Denies difficulty sleeping. No anxiety. Denies depression. No suicidal thoughts.       VITALS:   Vitals:    02/24/25 0926   BP: (!) 155/77   Pulse: 81  "  Resp: 19   SpO2: 100%   Weight: 117.1 kg (258 lb 2.5 oz)   Height: 5' 6.5" (1.689 m)   PainSc:   3   PainLoc: Back           PHYSICAL EXAM:   GENERAL: Well appearing, in no acute distress, alert and oriented x3.  PSYCH:  Mood and affect appropriate.  SKIN: Skin color, texture, turgor normal, no rashes or lesions.  HEENT:  Normocephalic, atraumatic. Cranial nerves grossly intact.  NECK: No pain to palpation over the cervical paraspinous muscles. No pain to palpation over facets. No pain with neck flexion, extension, or lateral flexion.   PULM: No evidence of respiratory difficulty, symmetric chest rise.  GI:  Non-distended  BACK: Normal range of motion. No pain to palpation over the spinous processes. No pain to palpation over facet joints. There is no pain with palpation over the sacroiliac joints bilaterally. Straight leg raising in the supine position is negative to radicular pain. Pressure to palpation of the coccyx.   EXTREMITIES: No deformities, edema, or skin discoloration.   MUSCULOSKELETAL: Shoulder, hip, and knee provocative maneuvers are negative. Bilateral upper and lower extremity strength is normal and symmetric. No atrophy is noted.  NEURO: Sensation is equal and appropriate bilaterally. Bilateral upper and lower extremity coordination and muscle stretch reflexes are physiologic and symmetric. Plantar response are downgoing.   GAIT: normal.        LABS: N/A     IMAGING:  X Ray Sacrum/Coccyx  CLINICAL HISTORY:  Unspecified injury of lower back, initial encounter     TECHNIQUE:  Two or three views of the sacrum and coccyx were performed.     COMPARISON:  None     FINDINGS:  Dorsal subluxation, near dislocation, approximately the 2nd coccygeal segment relative to the 1st.  There is likely an underlying fracture, though this is radiographically occult.     No suspicious sclerosis along the SI joints.  SI joints appear fairly symmetric.     Impression:     Abnormal study as above.       ASSESSMENT: 59 " y.o. year old male with pain, consistent with:    Encounter Diagnosis   Name Primary?    Coccyodynia Yes         DISCUSSION: Toño Ye is a 59 year old male patient who comes to us with midline coccygeal pain after a fall in the shower. The pain is worst with sitting and leaning back. Imaging revealed dorsal subluxation of the distal coccyx. Exam shows pain reproduced with palpation over coccyx. Fortunately, he is improving with conservative care.       PLAN:  Discontinue cyclobenzaprine 5 mg   May continue ibuprofen/tylenol PRN   Continue with Donut Cushion to offload weight off coccyx   Continue HEP  Follow up as needed      Radha Monique  02/24/2025

## 2025-02-26 DIAGNOSIS — E11.9 TYPE 2 DIABETES MELLITUS WITHOUT COMPLICATION: ICD-10-CM

## 2025-03-14 ENCOUNTER — TELEPHONE (OUTPATIENT)
Dept: CARDIOLOGY | Facility: CLINIC | Age: 60
End: 2025-03-14
Payer: COMMERCIAL

## 2025-03-19 DIAGNOSIS — E11.9 DIABETES MELLITUS WITHOUT COMPLICATION: ICD-10-CM

## 2025-03-19 RX ORDER — METFORMIN HYDROCHLORIDE 500 MG/1
500 TABLET, EXTENDED RELEASE ORAL
Qty: 90 TABLET | Refills: 3 | Status: SHIPPED | OUTPATIENT
Start: 2025-03-19

## 2025-03-19 NOTE — TELEPHONE ENCOUNTER
Care Due:                  Date            Visit Type   Department     Provider  --------------------------------------------------------------------------------                                EP -                              PRIMARY      [facility] PRIMARY  Last Visit: 08-      CARE ([facility])   KIM Person                              EP -                              PRIMARY      [facility] PRIMARY  Next Visit: 03-      CARE ([facility])   KIM Person                                                            Last  Test          Frequency    Reason                     Performed    Due Date  --------------------------------------------------------------------------------    Cr..........  12 months..  metFORMIN................  02- 02-    HBA1C.......  6 months...  metFORMIN................  08- 02-    Health system Embedded Care Due Messages. Reference number: [id number].   3/19/2025 12:19:39 AM CDT

## 2025-03-19 NOTE — TELEPHONE ENCOUNTER
Refill Routing Note   Medication(s) are not appropriate for processing by [facility] for the following reason(s):        Required labs outdated    [facility] action(s):  Defer   Requires labs : Yes             Appointments  past 12m or future 3m with PCP    Date Provider   Last Visit   8/27/2024 Peter Person MD   Next Visit   3/27/2025 Peter Person MD   ED visits in past 90 days: 0        Note composed:10:51 AM 03/19/2025

## 2025-03-25 ENCOUNTER — LAB VISIT (OUTPATIENT)
Dept: LAB | Facility: HOSPITAL | Age: 60
End: 2025-03-25
Attending: INTERNAL MEDICINE
Payer: COMMERCIAL

## 2025-03-25 ENCOUNTER — RESULTS FOLLOW-UP (OUTPATIENT)
Dept: PRIMARY CARE CLINIC | Facility: CLINIC | Age: 60
End: 2025-03-25

## 2025-03-25 DIAGNOSIS — E11.9 TYPE 2 DIABETES MELLITUS WITHOUT COMPLICATION: ICD-10-CM

## 2025-03-25 LAB
CHOLEST SERPL-MCNC: 146 MG/DL (ref 120–199)
CHOLEST/HDLC SERPL: 5.4 {RATIO} (ref 2–5)
EAG (OHS): 171 MG/DL (ref 68–131)
HBA1C MFR BLD: 7.6 % (ref 4–5.6)
HDLC SERPL-MCNC: 27 MG/DL (ref 40–75)
HDLC SERPL: 18.5 % (ref 20–50)
LDLC SERPL CALC-MCNC: ABNORMAL MG/DL
NONHDLC SERPL-MCNC: 119 MG/DL
TRIGL SERPL-MCNC: 815 MG/DL (ref 30–150)

## 2025-03-25 PROCEDURE — 83036 HEMOGLOBIN GLYCOSYLATED A1C: CPT

## 2025-03-25 PROCEDURE — 36415 COLL VENOUS BLD VENIPUNCTURE: CPT

## 2025-03-25 PROCEDURE — 82465 ASSAY BLD/SERUM CHOLESTEROL: CPT

## 2025-03-25 RX ORDER — ATORVASTATIN CALCIUM 80 MG/1
80 TABLET, FILM COATED ORAL DAILY
Qty: 90 TABLET | Refills: 3 | Status: SHIPPED | OUTPATIENT
Start: 2025-03-25

## 2025-03-27 ENCOUNTER — OFFICE VISIT (OUTPATIENT)
Dept: PRIMARY CARE CLINIC | Facility: CLINIC | Age: 60
End: 2025-03-27
Payer: COMMERCIAL

## 2025-03-27 VITALS
SYSTOLIC BLOOD PRESSURE: 138 MMHG | BODY MASS INDEX: 40.24 KG/M2 | HEIGHT: 67 IN | WEIGHT: 256.38 LBS | DIASTOLIC BLOOD PRESSURE: 84 MMHG | RESPIRATION RATE: 18 BRPM | HEART RATE: 73 BPM | OXYGEN SATURATION: 97 %

## 2025-03-27 DIAGNOSIS — E66.01 MORBID OBESITY: ICD-10-CM

## 2025-03-27 DIAGNOSIS — E78.2 MIXED HYPERLIPIDEMIA: ICD-10-CM

## 2025-03-27 DIAGNOSIS — E11.9 DIABETES MELLITUS WITHOUT COMPLICATION: ICD-10-CM

## 2025-03-27 DIAGNOSIS — Z00.00 ANNUAL PHYSICAL EXAM: Primary | ICD-10-CM

## 2025-03-27 PROCEDURE — 1160F RVW MEDS BY RX/DR IN RCRD: CPT | Mod: CPTII,S$GLB,, | Performed by: INTERNAL MEDICINE

## 2025-03-27 PROCEDURE — 99396 PREV VISIT EST AGE 40-64: CPT | Mod: S$GLB,,, | Performed by: INTERNAL MEDICINE

## 2025-03-27 PROCEDURE — 3008F BODY MASS INDEX DOCD: CPT | Mod: CPTII,S$GLB,, | Performed by: INTERNAL MEDICINE

## 2025-03-27 PROCEDURE — 1159F MED LIST DOCD IN RCRD: CPT | Mod: CPTII,S$GLB,, | Performed by: INTERNAL MEDICINE

## 2025-03-27 PROCEDURE — 3051F HG A1C>EQUAL 7.0%<8.0%: CPT | Mod: CPTII,S$GLB,, | Performed by: INTERNAL MEDICINE

## 2025-03-27 PROCEDURE — 3079F DIAST BP 80-89 MM HG: CPT | Mod: CPTII,S$GLB,, | Performed by: INTERNAL MEDICINE

## 2025-03-27 PROCEDURE — 99999 PR PBB SHADOW E&M-EST. PATIENT-LVL III: CPT | Mod: PBBFAC,,, | Performed by: INTERNAL MEDICINE

## 2025-03-27 PROCEDURE — 3075F SYST BP GE 130 - 139MM HG: CPT | Mod: CPTII,S$GLB,, | Performed by: INTERNAL MEDICINE

## 2025-03-27 RX ORDER — OMEGA-3-ACID ETHYL ESTERS 1 G/1
2 CAPSULE, LIQUID FILLED ORAL 2 TIMES DAILY
Qty: 360 CAPSULE | Refills: 3 | Status: SHIPPED | OUTPATIENT
Start: 2025-03-27 | End: 2026-03-27

## 2025-03-27 RX ORDER — METFORMIN HYDROCHLORIDE 750 MG/1
750 TABLET, EXTENDED RELEASE ORAL DAILY
Qty: 90 TABLET | Refills: 3 | Status: SHIPPED | OUTPATIENT
Start: 2025-03-27

## 2025-03-27 NOTE — PROGRESS NOTES
[facility] Note    Chief Complaint      Chief Complaint   Patient presents with    Annual Exam       History of Present Illness      Toño Ye is a 59 y.o. male who presents today for   Chief Complaint   Patient presents with    Annual Exam   .  Patient comes to appointment here for annual preventative visit with me . He is currently feeling  well . Recently ahd fall with coccyx fracture . He is healing but jhas been limited in exercise . Bs shows increase in A1c to 7.6 and elevated tg as well     HPI    No problem-specific Assessment & Plan notes found for this encounter.       Problem List Items Addressed This Visit       Annual physical exam - Primary    Overview   pe documented needs all screening labs reviewed with patient           Diabetes mellitus without complication    Overview   a1c 7 little higher admits to non complaince with diet . He will get strict againe cont gabapentin          Morbid obesity    Overview   He will be getting back to his exericse and diet regimen once cooccyx pain imprives          Mixed hyperlipidemia    Overview   Cont lipitor 80 mg , add lovaza              Past Medical History:  Past Medical History:   Diagnosis Date    Diabetes mellitus     Hyperlipidemia     Hypertension        Past Surgical History:  Past Surgical History:   Procedure Laterality Date    COLONOSCOPY  11/20/2017    COLONOSCOPY N/A 5/17/2023    Procedure: COLONOSCOPY;  Surgeon: Simone Lemus MD;  Location: [facility] ([address] [place] [address]);  Service: Colon and Rectal;  Laterality: N/A;  instr emailed/portal-GT    KNEE SURGERY         Family History:  family history includes Arthritis in his father and mother; Cancer in his father; Depression in his sister; Diabetes in his father; Drug abuse in his brother; Heart disease in his father; Hyperlipidemia in his father; Hypertension in his father and mother; Lung cancer in his father.     Social History:  Social History[1]    Review  "of Systems:   Review of Systems   Constitutional:  Negative for fever and weight loss.   HENT:  Negative for congestion, hearing loss and sore throat.    Eyes:  Negative for blurred vision.   Respiratory:  Negative for cough and shortness of breath.    Cardiovascular:  Negative for chest pain, palpitations, claudication and leg swelling.   Gastrointestinal:  Negative for abdominal pain, constipation, diarrhea, heartburn, nausea and vomiting.   Genitourinary:  Negative for dysuria.   Musculoskeletal:  Positive for back pain. Negative for myalgias.   Skin:  Negative for rash.   Neurological:  Negative for focal weakness and headaches.   Psychiatric/Behavioral:  Negative for depression, memory loss and suicidal ideas. The patient is not nervous/anxious.         Medications:  Encounter Medications[2]    Allergies:  Review of patient's allergies indicates:  No Known Allergies      Physical Exam      Vitals:    03/27/25 1332   BP: 138/84   Pulse: 73   Resp: 18        Vital Signs  Pulse: 73  Resp: 18  SpO2: 97 %  BP: 138/84  BP Location: Right arm  Patient Position: Sitting  Pain Score: 0-No pain  Height and Weight  Height: 5' 6.5" (168.9 cm)  Weight: 116.3 kg (256 lb 6.3 oz)  BSA (Calculated - sq m): 2.34 sq meters  BMI (Calculated): 40.8  Weight in (lb) to have BMI = 25: 156.9]     Body mass index is 40.76 kg/m².    Physical Exam  Constitutional:       Appearance: He is well-developed.   HENT:      Head: Normocephalic.   Eyes:      Pupils: Pupils are equal, round, and reactive to light.   Neck:      Thyroid: No thyromegaly.   Cardiovascular:      Rate and Rhythm: Normal rate and regular rhythm.      Heart sounds: No murmur heard.     No friction rub. No gallop.   Pulmonary:      Effort: Pulmonary effort is normal.      Breath sounds: Normal breath sounds.   Abdominal:      General: Bowel sounds are normal.      Palpations: Abdomen is soft.   Musculoskeletal:         General: Normal range of motion.      Cervical back: " "Normal range of motion.   Feet:      Right foot:      Protective Sensation: 4 sites tested.  4 sites sensed.      Left foot:      Protective Sensation: 4 sites tested.  4 sites sensed.   Skin:     General: Skin is warm and dry.   Neurological:      Mental Status: He is alert and oriented to person, place, and time.      Sensory: No sensory deficit.   Psychiatric:         Behavior: Behavior normal.          Laboratory:  CBC:  No results for input(s): "WBC", "RBC", "HGB", "HCT", "PLT", "MCV", "MCH", "MCHC" in the last 2160 hours.  CMP:  No results for input(s): "GLU", "CALCIUM", "ALBUMIN", "PROT", "NA", "K", "CO2", "CL", "BUN", "ALKPHOS", "ALT", "AST", "BILITOT" in the last 2160 hours.    Invalid input(s): "CREATININ"  URINALYSIS:  No results for input(s): "COLORU", "CLARITYU", "SPECGRAV", "PHUR", "PROTEINUA", "GLUCOSEU", "BILIRUBINCON", "BLOODU", "WBCU", "RBCU", "BACTERIA", "MUCUS", "NITRITE", "LEUKOCYTESUR", "UROBILINOGEN", "HYALINECASTS" in the last 2160 hours.   LIPIDS:  Recent Labs   Lab Result Units 03/25/25  0911   HDL Cholesterol mg/dL 27*   Cholesterol Total mg/dL 146   Triglyceride mg/dL 815*   HDL/Cholesterol Ratio % 18.5*   Non HDL Cholesterol mg/dL 119   Cholesterol/HDL Ratio  5.4*     TSH:  No results for input(s): "TSH" in the last 2160 hours.  A1C:  Recent Labs   Lab Result Units 03/25/25  0911   Hemoglobin A1c % 7.6*       Radiology:        Assessment:     Toño Ye is a 59 y.o.male with:    Annual physical exam    Diabetes mellitus without complication  -     metFORMIN (GLUCOPHAGE-XR) 750 MG ER 24hr tablet; Take 1 tablet (750 mg total) by mouth Daily.  Dispense: 90 tablet; Refill: 3    Morbid obesity    Mixed hyperlipidemia  -     omega-3 acid ethyl esters (LOVAZA) 1 gram capsule; Take 2 capsules (2 g total) by mouth 2 (two) times daily.  Dispense: 360 capsule; Refill: 3                Plan:     Problem List Items Addressed This Visit       Annual physical exam - Primary    Overview "   pe documented needs all screening labs reviewed with patient           Diabetes mellitus without complication    Overview   a1c 7 little higher admits to non complaince with diet . He will get strict againe cont gabapentin          Morbid obesity    Overview   He will be getting back to his exericse and diet regimen once cooccyx pain imprives          Mixed hyperlipidemia    Overview   Cont lipitor 80 mg , add lovaza             As above, continue current medications and maintain follow up with specialists.  Return to clinic in 3 months.    '  Frederick W Dantagnan [facility]                       [1]   Social History  Socioeconomic History    Marital status:    Tobacco Use    Smoking status: Never    Smokeless tobacco: Never   Substance and Sexual Activity    Alcohol use: Yes     Alcohol/week: 4.0 standard drinks of alcohol     Types: 2 Glasses of wine, 2 Drinks containing 0.5 oz of alcohol per week    Drug use: Never    Sexual activity: Yes     Partners: Female     Birth control/protection: None   [2]   Outpatient Encounter Medications as of 3/27/2025   Medication Sig Dispense Refill    atorvastatin (LIPITOR) 80 MG tablet TAKE 1 TABLET (80 MG TOTAL) BY MOUTH ONCE DAILY. 90 tablet 3    nebivoloL (BYSTOLIC) 10 MG Tab TAKE 1 TABLET BY MOUTH EVERY DAY 90 tablet 3    nebivoloL (BYSTOLIC) 10 MG Tab Take 1 tablet (10 mg total) by mouth once daily. 90 tablet 3    traZODone (DESYREL) 50 MG tablet TAKE 1 TABLET BY MOUTH EVERY EVENING. 90 tablet 2    [DISCONTINUED] metFORMIN (GLUCOPHAGE-XR) 500 MG ER 24hr tablet TAKE 1 TABLET BY MOUTH EVERY DAY WITH BREAKFAST 90 tablet 3    metFORMIN (GLUCOPHAGE-XR) 750 MG ER 24hr tablet Take 1 tablet (750 mg total) by mouth Daily. 90 tablet 3    omega-3 acid ethyl esters (LOVAZA) 1 gram capsule Take 2 capsules (2 g total) by mouth 2 (two) times daily. 360 capsule 3    [DISCONTINUED] atorvastatin (LIPITOR) 80 MG tablet Take 1 tablet (80 mg  total) by mouth once daily. 90 tablet 3    [DISCONTINUED] cyclobenzaprine (FLEXERIL) 5 MG tablet Take 1 tablet (5 mg total) by mouth nightly as needed for Muscle spasms. 30 tablet 0    [DISCONTINUED] metFORMIN (GLUCOPHAGE-XR) 500 MG ER 24hr tablet TAKE 1 TABLET BY MOUTH EVERY DAY WITH BREAKFAST 90 tablet 0     No facility-administered encounter medications on file as of 3/27/2025.

## 2025-05-01 DIAGNOSIS — I10 BENIGN ESSENTIAL HYPERTENSION: ICD-10-CM

## 2025-06-17 ENCOUNTER — PATIENT MESSAGE (OUTPATIENT)
Dept: PRIMARY CARE CLINIC | Facility: CLINIC | Age: 60
End: 2025-06-17
Payer: COMMERCIAL

## 2025-06-17 DIAGNOSIS — E11.9 DIABETES MELLITUS WITHOUT COMPLICATION: Primary | ICD-10-CM

## 2025-06-17 DIAGNOSIS — E78.2 MIXED HYPERLIPIDEMIA: ICD-10-CM

## 2025-06-17 DIAGNOSIS — Z12.5 SCREENING PSA (PROSTATE SPECIFIC ANTIGEN): ICD-10-CM

## 2025-06-19 ENCOUNTER — RESULTS FOLLOW-UP (OUTPATIENT)
Dept: PRIMARY CARE CLINIC | Facility: CLINIC | Age: 60
End: 2025-06-19

## 2025-06-19 ENCOUNTER — LAB VISIT (OUTPATIENT)
Dept: LAB | Facility: HOSPITAL | Age: 60
End: 2025-06-19
Attending: INTERNAL MEDICINE
Payer: COMMERCIAL

## 2025-06-19 DIAGNOSIS — E78.2 MIXED HYPERLIPIDEMIA: ICD-10-CM

## 2025-06-19 DIAGNOSIS — Z12.5 SCREENING PSA (PROSTATE SPECIFIC ANTIGEN): ICD-10-CM

## 2025-06-19 DIAGNOSIS — E11.9 DIABETES MELLITUS WITHOUT COMPLICATION: ICD-10-CM

## 2025-06-19 LAB
ABSOLUTE EOSINOPHIL (OHS): 0.12 K/UL
ABSOLUTE MONOCYTE (OHS): 0.8 K/UL (ref 0.3–1)
ABSOLUTE NEUTROPHIL COUNT (OHS): 3.92 K/UL (ref 1.8–7.7)
ALBUMIN SERPL BCP-MCNC: 4.4 G/DL (ref 3.5–5.2)
ALP SERPL-CCNC: 78 UNIT/L (ref 40–150)
ALT SERPL W/O P-5'-P-CCNC: 41 UNIT/L (ref 10–44)
ANION GAP (OHS): 13 MMOL/L (ref 8–16)
AST SERPL-CCNC: 26 UNIT/L (ref 11–45)
BASOPHILS # BLD AUTO: 0.05 K/UL
BASOPHILS NFR BLD AUTO: 0.7 %
BILIRUB SERPL-MCNC: 1 MG/DL (ref 0.1–1)
BUN SERPL-MCNC: 36 MG/DL (ref 6–20)
CALCIUM SERPL-MCNC: 9.6 MG/DL (ref 8.7–10.5)
CHLORIDE SERPL-SCNC: 104 MMOL/L (ref 95–110)
CHOLEST SERPL-MCNC: 118 MG/DL (ref 120–199)
CHOLEST/HDLC SERPL: 4.4 {RATIO} (ref 2–5)
CO2 SERPL-SCNC: 26 MMOL/L (ref 23–29)
CREAT SERPL-MCNC: 0.9 MG/DL (ref 0.5–1.4)
EAG (OHS): 169 MG/DL (ref 68–131)
ERYTHROCYTE [DISTWIDTH] IN BLOOD BY AUTOMATED COUNT: 14 % (ref 11.5–14.5)
GFR SERPLBLD CREATININE-BSD FMLA CKD-EPI: >60 ML/MIN/1.73/M2
GLUCOSE SERPL-MCNC: 140 MG/DL (ref 70–110)
HBA1C MFR BLD: 7.5 % (ref 4–5.6)
HCT VFR BLD AUTO: 47.4 % (ref 40–54)
HDLC SERPL-MCNC: 27 MG/DL (ref 40–75)
HDLC SERPL: 22.9 % (ref 20–50)
HGB BLD-MCNC: 15.7 GM/DL (ref 14–18)
IMM GRANULOCYTES # BLD AUTO: 0.02 K/UL (ref 0–0.04)
IMM GRANULOCYTES NFR BLD AUTO: 0.3 % (ref 0–0.5)
LDLC SERPL CALC-MCNC: 38.8 MG/DL (ref 63–159)
LYMPHOCYTES # BLD AUTO: 2.41 K/UL (ref 1–4.8)
MCH RBC QN AUTO: 28.5 PG (ref 27–31)
MCHC RBC AUTO-ENTMCNC: 33.1 G/DL (ref 32–36)
MCV RBC AUTO: 86 FL (ref 82–98)
NONHDLC SERPL-MCNC: 91 MG/DL
NUCLEATED RBC (/100WBC) (OHS): 0 /100 WBC
PLATELET # BLD AUTO: 319 K/UL (ref 150–450)
PMV BLD AUTO: 10 FL (ref 9.2–12.9)
POTASSIUM SERPL-SCNC: 4.4 MMOL/L (ref 3.5–5.1)
PROT SERPL-MCNC: 7.4 GM/DL (ref 6–8.4)
PSA SERPL-MCNC: 1.54 NG/ML
RBC # BLD AUTO: 5.5 M/UL (ref 4.6–6.2)
RELATIVE EOSINOPHIL (OHS): 1.6 %
RELATIVE LYMPHOCYTE (OHS): 32.9 % (ref 18–48)
RELATIVE MONOCYTE (OHS): 10.9 % (ref 4–15)
RELATIVE NEUTROPHIL (OHS): 53.6 % (ref 38–73)
SODIUM SERPL-SCNC: 143 MMOL/L (ref 136–145)
TRIGL SERPL-MCNC: 261 MG/DL (ref 30–150)
WBC # BLD AUTO: 7.32 K/UL (ref 3.9–12.7)

## 2025-06-19 PROCEDURE — 82247 BILIRUBIN TOTAL: CPT

## 2025-06-19 PROCEDURE — 84153 ASSAY OF PSA TOTAL: CPT

## 2025-06-19 PROCEDURE — 83036 HEMOGLOBIN GLYCOSYLATED A1C: CPT

## 2025-06-19 PROCEDURE — 82465 ASSAY BLD/SERUM CHOLESTEROL: CPT

## 2025-06-19 PROCEDURE — 36415 COLL VENOUS BLD VENIPUNCTURE: CPT

## 2025-06-19 PROCEDURE — 85025 COMPLETE CBC W/AUTO DIFF WBC: CPT

## 2025-06-27 ENCOUNTER — PATIENT OUTREACH (OUTPATIENT)
Dept: ADMINISTRATIVE | Facility: HOSPITAL | Age: 60
End: 2025-06-27
Payer: COMMERCIAL

## 2025-06-27 ENCOUNTER — OFFICE VISIT (OUTPATIENT)
Dept: PRIMARY CARE CLINIC | Facility: CLINIC | Age: 60
End: 2025-06-27
Payer: COMMERCIAL

## 2025-06-27 VITALS
OXYGEN SATURATION: 95 % | SYSTOLIC BLOOD PRESSURE: 118 MMHG | HEART RATE: 74 BPM | BODY MASS INDEX: 39.83 KG/M2 | WEIGHT: 253.75 LBS | RESPIRATION RATE: 18 BRPM | HEIGHT: 67 IN | DIASTOLIC BLOOD PRESSURE: 80 MMHG

## 2025-06-27 DIAGNOSIS — F41.9 ANXIETY AND DEPRESSION: Primary | ICD-10-CM

## 2025-06-27 DIAGNOSIS — E78.2 MIXED HYPERLIPIDEMIA: ICD-10-CM

## 2025-06-27 DIAGNOSIS — E11.9 DIABETES MELLITUS WITHOUT COMPLICATION: ICD-10-CM

## 2025-06-27 DIAGNOSIS — F32.A ANXIETY AND DEPRESSION: Primary | ICD-10-CM

## 2025-06-27 DIAGNOSIS — I10 BENIGN ESSENTIAL HYPERTENSION: ICD-10-CM

## 2025-06-27 DIAGNOSIS — G47.33 OSA (OBSTRUCTIVE SLEEP APNEA): ICD-10-CM

## 2025-06-27 PROCEDURE — 99999 PR PBB SHADOW E&M-EST. PATIENT-LVL III: CPT | Mod: PBBFAC,,, | Performed by: INTERNAL MEDICINE

## 2025-06-27 RX ORDER — BUPROPION HYDROCHLORIDE 150 MG/1
150 TABLET ORAL DAILY
Qty: 30 TABLET | Refills: 5 | Status: SHIPPED | OUTPATIENT
Start: 2025-06-27 | End: 2026-06-27

## 2025-06-27 RX ORDER — BUPROPION HYDROCHLORIDE 150 MG/1
150 TABLET, EXTENDED RELEASE ORAL 2 TIMES DAILY
COMMUNITY
End: 2025-06-27

## 2025-08-11 ENCOUNTER — LAB VISIT (OUTPATIENT)
Dept: LAB | Facility: HOSPITAL | Age: 60
End: 2025-08-11
Attending: INTERNAL MEDICINE
Payer: COMMERCIAL

## 2025-08-11 ENCOUNTER — RESULTS FOLLOW-UP (OUTPATIENT)
Dept: PRIMARY CARE CLINIC | Facility: CLINIC | Age: 60
End: 2025-08-11

## 2025-08-11 ENCOUNTER — OFFICE VISIT (OUTPATIENT)
Dept: PRIMARY CARE CLINIC | Facility: CLINIC | Age: 60
End: 2025-08-11
Payer: COMMERCIAL

## 2025-08-11 VITALS
WEIGHT: 254.44 LBS | OXYGEN SATURATION: 97 % | BODY MASS INDEX: 39.93 KG/M2 | HEART RATE: 77 BPM | RESPIRATION RATE: 18 BRPM | DIASTOLIC BLOOD PRESSURE: 80 MMHG | HEIGHT: 67 IN | SYSTOLIC BLOOD PRESSURE: 148 MMHG

## 2025-08-11 DIAGNOSIS — R31.9 HEMATURIA, UNSPECIFIED TYPE: Primary | ICD-10-CM

## 2025-08-11 DIAGNOSIS — R31.9 HEMATURIA, UNSPECIFIED TYPE: ICD-10-CM

## 2025-08-11 LAB
BACTERIA #/AREA URNS AUTO: ABNORMAL /HPF
BILIRUB UR QL STRIP.AUTO: NEGATIVE
CLARITY UR: ABNORMAL
COLOR UR AUTO: ABNORMAL
GLUCOSE UR QL STRIP: NEGATIVE
HGB UR QL STRIP: ABNORMAL
HYALINE CASTS UR QL AUTO: 0 /LPF (ref 0–1)
KETONES UR QL STRIP: NEGATIVE
LEUKOCYTE ESTERASE UR QL STRIP: NEGATIVE
MICROSCOPIC COMMENT: ABNORMAL
NITRITE UR QL STRIP: NEGATIVE
PH UR STRIP: 7 [PH]
PROT UR QL STRIP: ABNORMAL
RBC #/AREA URNS AUTO: >100 /HPF (ref 0–4)
SP GR UR STRIP: 1.02
UROBILINOGEN UR STRIP-ACNC: NEGATIVE EU/DL
WBC #/AREA URNS AUTO: 0 /HPF (ref 0–5)

## 2025-08-11 PROCEDURE — 99999 PR PBB SHADOW E&M-EST. PATIENT-LVL IV: CPT | Mod: PBBFAC,,, | Performed by: INTERNAL MEDICINE

## 2025-08-11 PROCEDURE — 81003 URINALYSIS AUTO W/O SCOPE: CPT

## 2025-08-11 PROCEDURE — 1160F RVW MEDS BY RX/DR IN RCRD: CPT | Mod: CPTII,S$GLB,, | Performed by: INTERNAL MEDICINE

## 2025-08-11 PROCEDURE — 1159F MED LIST DOCD IN RCRD: CPT | Mod: CPTII,S$GLB,, | Performed by: INTERNAL MEDICINE

## 2025-08-11 PROCEDURE — 3008F BODY MASS INDEX DOCD: CPT | Mod: CPTII,S$GLB,, | Performed by: INTERNAL MEDICINE

## 2025-08-11 PROCEDURE — 3051F HG A1C>EQUAL 7.0%<8.0%: CPT | Mod: CPTII,S$GLB,, | Performed by: INTERNAL MEDICINE

## 2025-08-11 PROCEDURE — 3079F DIAST BP 80-89 MM HG: CPT | Mod: CPTII,S$GLB,, | Performed by: INTERNAL MEDICINE

## 2025-08-11 PROCEDURE — 3077F SYST BP >= 140 MM HG: CPT | Mod: CPTII,S$GLB,, | Performed by: INTERNAL MEDICINE

## 2025-08-11 PROCEDURE — 99214 OFFICE O/P EST MOD 30 MIN: CPT | Mod: S$GLB,,, | Performed by: INTERNAL MEDICINE

## 2025-08-12 LAB — HOLD SPECIMEN: NORMAL

## 2025-08-18 ENCOUNTER — OFFICE VISIT (OUTPATIENT)
Dept: PRIMARY CARE CLINIC | Facility: CLINIC | Age: 60
End: 2025-08-18
Payer: COMMERCIAL

## 2025-08-18 DIAGNOSIS — R05.1 ACUTE COUGH: Primary | ICD-10-CM

## 2025-08-18 PROCEDURE — 1159F MED LIST DOCD IN RCRD: CPT | Mod: CPTII,95,, | Performed by: INTERNAL MEDICINE

## 2025-08-18 PROCEDURE — 3051F HG A1C>EQUAL 7.0%<8.0%: CPT | Mod: CPTII,95,, | Performed by: INTERNAL MEDICINE

## 2025-08-18 PROCEDURE — 98005 SYNCH AUDIO-VIDEO EST LOW 20: CPT | Mod: 95,,, | Performed by: INTERNAL MEDICINE

## 2025-08-18 PROCEDURE — 1160F RVW MEDS BY RX/DR IN RCRD: CPT | Mod: CPTII,95,, | Performed by: INTERNAL MEDICINE

## 2025-08-18 RX ORDER — BENZONATATE 200 MG/1
200 CAPSULE ORAL 3 TIMES DAILY PRN
Qty: 30 CAPSULE | Refills: 1 | Status: SHIPPED | OUTPATIENT
Start: 2025-08-18 | End: 2025-09-07

## 2025-08-20 ENCOUNTER — HOSPITAL ENCOUNTER (OUTPATIENT)
Dept: RADIOLOGY | Facility: HOSPITAL | Age: 60
Discharge: HOME | End: 2025-08-20
Attending: INTERNAL MEDICINE
Payer: COMMERCIAL

## 2025-08-20 DIAGNOSIS — R31.9 HEMATURIA, UNSPECIFIED TYPE: ICD-10-CM

## 2025-08-20 PROCEDURE — 76770 US EXAM ABDO BACK WALL COMP: CPT | Mod: TC

## 2025-08-20 PROCEDURE — 76770 US EXAM ABDO BACK WALL COMP: CPT | Mod: 26,,, | Performed by: RADIOLOGY

## 2025-08-25 ENCOUNTER — PATIENT MESSAGE (OUTPATIENT)
Dept: PRIMARY CARE CLINIC | Facility: CLINIC | Age: 60
End: 2025-08-25
Payer: COMMERCIAL

## 2025-08-26 ENCOUNTER — TELEPHONE (OUTPATIENT)
Dept: PRIMARY CARE CLINIC | Facility: CLINIC | Age: 60
End: 2025-08-26
Payer: COMMERCIAL